# Patient Record
Sex: FEMALE | Race: WHITE | Employment: FULL TIME | URBAN - METROPOLITAN AREA
[De-identification: names, ages, dates, MRNs, and addresses within clinical notes are randomized per-mention and may not be internally consistent; named-entity substitution may affect disease eponyms.]

---

## 2018-10-22 ENCOUNTER — TELEPHONE (OUTPATIENT)
Dept: FAMILY MEDICINE CLINIC | Facility: CLINIC | Age: 23
End: 2018-10-22

## 2018-10-22 NOTE — TELEPHONE ENCOUNTER
Patient calling to make sick appointment, not an established patient. Has first appt to establish care 12/17/18. Patient wanted to be seen for yeast infection symptoms. Advised she may try otc treatment, or may also go to walk-in clinic.  Patient verbalized

## 2018-12-17 ENCOUNTER — OFFICE VISIT (OUTPATIENT)
Dept: FAMILY MEDICINE CLINIC | Facility: CLINIC | Age: 23
End: 2018-12-17

## 2018-12-17 VITALS
BODY MASS INDEX: 19.9 KG/M2 | SYSTOLIC BLOOD PRESSURE: 129 MMHG | DIASTOLIC BLOOD PRESSURE: 55 MMHG | HEIGHT: 70 IN | HEART RATE: 80 BPM | WEIGHT: 139 LBS

## 2018-12-17 DIAGNOSIS — K01.1 IMPACTED TEETH: Primary | ICD-10-CM

## 2018-12-17 PROCEDURE — 99203 OFFICE O/P NEW LOW 30 MIN: CPT | Performed by: FAMILY MEDICINE

## 2018-12-17 PROCEDURE — 99212 OFFICE O/P EST SF 10 MIN: CPT | Performed by: FAMILY MEDICINE

## 2018-12-17 PROCEDURE — 90651 9VHPV VACCINE 2/3 DOSE IM: CPT | Performed by: FAMILY MEDICINE

## 2018-12-17 PROCEDURE — 90471 IMMUNIZATION ADMIN: CPT | Performed by: FAMILY MEDICINE

## 2018-12-17 NOTE — PROGRESS NOTES
Bebo Raman is a 24year old female. Patient presents with:  Consult: wisdom teeth   mm  HPI:   New to me. To establish care. Saw dentist about 3 weeks ago and told impacted wisdom teeth. Does have some pain with it. Never had pap smear done.  Me

## 2019-02-23 ENCOUNTER — NURSE ONLY (OUTPATIENT)
Dept: FAMILY MEDICINE CLINIC | Facility: CLINIC | Age: 24
End: 2019-02-23

## 2019-02-23 DIAGNOSIS — Z23 IMMUNIZATION DUE: Primary | ICD-10-CM

## 2019-02-23 PROCEDURE — 90651 9VHPV VACCINE 2/3 DOSE IM: CPT | Performed by: FAMILY MEDICINE

## 2019-02-23 PROCEDURE — 90471 IMMUNIZATION ADMIN: CPT | Performed by: FAMILY MEDICINE

## 2019-02-23 NOTE — PROGRESS NOTES
Patient is here for her 2nd hpv, verified name,  and medication. Patient tolerated injection well.  Informed patient to return in  for her final hpv vaccine, patient expressed understanding

## 2019-06-04 ENCOUNTER — PATIENT OUTREACH (OUTPATIENT)
Dept: CASE MANAGEMENT | Age: 24
End: 2019-06-04

## 2019-09-21 ENCOUNTER — OFFICE VISIT (OUTPATIENT)
Dept: FAMILY MEDICINE CLINIC | Facility: CLINIC | Age: 24
End: 2019-09-21

## 2019-09-21 ENCOUNTER — LAB ENCOUNTER (OUTPATIENT)
Dept: LAB | Age: 24
End: 2019-09-21
Attending: FAMILY MEDICINE
Payer: COMMERCIAL

## 2019-09-21 VITALS
WEIGHT: 148 LBS | BODY MASS INDEX: 21.19 KG/M2 | SYSTOLIC BLOOD PRESSURE: 111 MMHG | HEART RATE: 54 BPM | DIASTOLIC BLOOD PRESSURE: 69 MMHG | HEIGHT: 70 IN

## 2019-09-21 DIAGNOSIS — R19.00 PELVIC SWELLING: ICD-10-CM

## 2019-09-21 DIAGNOSIS — Z00.00 ROUTINE MEDICAL EXAM: Primary | ICD-10-CM

## 2019-09-21 DIAGNOSIS — Z00.00 ROUTINE MEDICAL EXAM: ICD-10-CM

## 2019-09-21 DIAGNOSIS — K01.1 IMPACTED TOOTH: ICD-10-CM

## 2019-09-21 LAB
ALBUMIN SERPL-MCNC: 3.9 G/DL (ref 3.4–5)
ALBUMIN/GLOB SERPL: 1.1 {RATIO} (ref 1–2)
ALP LIVER SERPL-CCNC: 62 U/L (ref 37–98)
ALT SERPL-CCNC: 29 U/L (ref 13–56)
ANION GAP SERPL CALC-SCNC: 6 MMOL/L (ref 0–18)
AST SERPL-CCNC: 29 U/L (ref 15–37)
BASOPHILS # BLD AUTO: 0.06 X10(3) UL (ref 0–0.2)
BASOPHILS NFR BLD AUTO: 1.3 %
BILIRUB SERPL-MCNC: 0.5 MG/DL (ref 0.1–2)
BUN BLD-MCNC: 14 MG/DL (ref 7–18)
BUN/CREAT SERPL: 13.6 (ref 10–20)
CALCIUM BLD-MCNC: 9.1 MG/DL (ref 8.5–10.1)
CHLORIDE SERPL-SCNC: 107 MMOL/L (ref 98–112)
CHOLEST SMN-MCNC: 160 MG/DL (ref ?–200)
CO2 SERPL-SCNC: 27 MMOL/L (ref 21–32)
CREAT BLD-MCNC: 1.03 MG/DL (ref 0.55–1.02)
DEPRECATED RDW RBC AUTO: 47.6 FL (ref 35.1–46.3)
EOSINOPHIL # BLD AUTO: 0.16 X10(3) UL (ref 0–0.7)
EOSINOPHIL NFR BLD AUTO: 3.3 %
ERYTHROCYTE [DISTWIDTH] IN BLOOD BY AUTOMATED COUNT: 15.5 % (ref 11–15)
GLOBULIN PLAS-MCNC: 3.4 G/DL (ref 2.8–4.4)
GLUCOSE BLD-MCNC: 82 MG/DL (ref 70–99)
HCT VFR BLD AUTO: 35.7 % (ref 35–48)
HDLC SERPL-MCNC: 64 MG/DL (ref 40–59)
HGB BLD-MCNC: 11.2 G/DL (ref 12–16)
IMM GRANULOCYTES # BLD AUTO: 0.01 X10(3) UL (ref 0–1)
IMM GRANULOCYTES NFR BLD: 0.2 %
LDLC SERPL CALC-MCNC: 88 MG/DL (ref ?–100)
LYMPHOCYTES # BLD AUTO: 1.61 X10(3) UL (ref 1–4)
LYMPHOCYTES NFR BLD AUTO: 33.7 %
M PROTEIN MFR SERPL ELPH: 7.3 G/DL (ref 6.4–8.2)
MCH RBC QN AUTO: 26.7 PG (ref 26–34)
MCHC RBC AUTO-ENTMCNC: 31.4 G/DL (ref 31–37)
MCV RBC AUTO: 85 FL (ref 80–100)
MONOCYTES # BLD AUTO: 0.49 X10(3) UL (ref 0.1–1)
MONOCYTES NFR BLD AUTO: 10.3 %
NEUTROPHILS # BLD AUTO: 2.45 X10 (3) UL (ref 1.5–7.7)
NEUTROPHILS # BLD AUTO: 2.45 X10(3) UL (ref 1.5–7.7)
NEUTROPHILS NFR BLD AUTO: 51.2 %
NONHDLC SERPL-MCNC: 96 MG/DL (ref ?–130)
OSMOLALITY SERPL CALC.SUM OF ELEC: 290 MOSM/KG (ref 275–295)
PATIENT FASTING: YES
PATIENT FASTING: YES
PLATELET # BLD AUTO: 285 10(3)UL (ref 150–450)
POTASSIUM SERPL-SCNC: 4.5 MMOL/L (ref 3.5–5.1)
RBC # BLD AUTO: 4.2 X10(6)UL (ref 3.8–5.3)
SODIUM SERPL-SCNC: 140 MMOL/L (ref 136–145)
TRIGL SERPL-MCNC: 42 MG/DL (ref 30–149)
TSI SER-ACNC: 1.38 MIU/ML (ref 0.36–3.74)
VLDLC SERPL CALC-MCNC: 8 MG/DL (ref 0–30)
WBC # BLD AUTO: 4.8 X10(3) UL (ref 4–11)

## 2019-09-21 PROCEDURE — 80053 COMPREHEN METABOLIC PANEL: CPT

## 2019-09-21 PROCEDURE — 80061 LIPID PANEL: CPT

## 2019-09-21 PROCEDURE — 90651 9VHPV VACCINE 2/3 DOSE IM: CPT | Performed by: FAMILY MEDICINE

## 2019-09-21 PROCEDURE — 36415 COLL VENOUS BLD VENIPUNCTURE: CPT

## 2019-09-21 PROCEDURE — 82306 VITAMIN D 25 HYDROXY: CPT

## 2019-09-21 PROCEDURE — 99395 PREV VISIT EST AGE 18-39: CPT | Performed by: FAMILY MEDICINE

## 2019-09-21 PROCEDURE — 90471 IMMUNIZATION ADMIN: CPT | Performed by: FAMILY MEDICINE

## 2019-09-21 PROCEDURE — 84443 ASSAY THYROID STIM HORMONE: CPT

## 2019-09-21 PROCEDURE — 85025 COMPLETE CBC W/AUTO DIFF WBC: CPT

## 2019-09-21 NOTE — PROGRESS NOTES
HPI:   Chaya Goldstein is a 21year old female who presents for a complete physical exam.    Pt never had wisdom teeth removed. Monthly menses more or less. Sometimes does skip a month. Exercising. Does eat healthy overall.    Wt Readings from Last 3 tenderness  LUNGS: clear to auscultation  CARDIO: RRR without murmur  GI: good BS's,no HSM or tenderness, feels like 20 week US - firm to umbilicus. No tenderness.    MUSCULOSKELETAL: back is not tender,FROM of the back  EXTREMITIES: no cyanosis, clubbing o

## 2019-09-23 LAB — 25(OH)D3 SERPL-MCNC: 29.7 NG/ML (ref 30–100)

## 2019-09-24 NOTE — PROGRESS NOTES
Olena Lawrence - labs were all normal except mildly decreased vitamin D levels.  Please take over the counter vitamin D 2000 units daily. - Dr. Pineda Shells

## 2019-10-26 ENCOUNTER — HOSPITAL ENCOUNTER (OUTPATIENT)
Dept: ULTRASOUND IMAGING | Facility: HOSPITAL | Age: 24
Discharge: HOME | End: 2019-10-26
Attending: FAMILY MEDICINE
Payer: COMMERCIAL

## 2019-10-26 DIAGNOSIS — R19.00 PELVIC SWELLING: ICD-10-CM

## 2019-10-26 PROCEDURE — 76830 TRANSVAGINAL US NON-OB: CPT | Performed by: FAMILY MEDICINE

## 2019-10-26 PROCEDURE — 76856 US EXAM PELVIC COMPLETE: CPT | Performed by: FAMILY MEDICINE

## 2019-10-27 NOTE — PROGRESS NOTES
Pt has very large fibroid in her uterus causing the abdominal enlargement. It is 15 cm big. She needs to see gyne this week. Please give her options of OB groups and help get her an appointment.

## 2019-10-31 ENCOUNTER — OFFICE VISIT (OUTPATIENT)
Dept: OBGYN CLINIC | Facility: CLINIC | Age: 24
End: 2019-10-31

## 2019-10-31 VITALS
WEIGHT: 154 LBS | SYSTOLIC BLOOD PRESSURE: 123 MMHG | BODY MASS INDEX: 22 KG/M2 | DIASTOLIC BLOOD PRESSURE: 66 MMHG | HEART RATE: 74 BPM

## 2019-10-31 DIAGNOSIS — Z12.4 SCREENING FOR MALIGNANT NEOPLASM OF CERVIX: ICD-10-CM

## 2019-10-31 DIAGNOSIS — R19.00 PELVIC MASS IN FEMALE: Primary | ICD-10-CM

## 2019-10-31 PROCEDURE — 99214 OFFICE O/P EST MOD 30 MIN: CPT | Performed by: OBSTETRICS & GYNECOLOGY

## 2019-11-11 NOTE — H&P
HPI:  The patient is a 20 yo F who presents for consultation and recommendations regarding an abnormal ultrasound. The patient had an ultrasound performed on October 31 which showed a 15.6 cm soft tissue mass suspected to be a large pedunculated fibroid. mass.    OTHER:                          Negative.   Bladder appears normal.       Summary:                 Last Menstrual Period:           2019-09-23       Pelvis and Uterus:                Uterus Length:       11.81 cm           Uterus Height:       3.80 Non-medical: Not on file    Tobacco Use      Smoking status: Never Smoker      Smokeless tobacco: Never Used    Substance and Sexual Activity      Alcohol use: Yes        Comment: weekends       Drug use: No      Sexual activity: Not on file    Lifestyle normocephalic  Abdomen:  soft, nontender, nondistended, pelvic mass to the level of the umbilicus  Psychiatric: Appropriate mood and affect    Pelvic Exam:  External Genitalia: normal appearance, hair distribution, and no lesions  Urethral Meatus:  normal

## 2019-11-14 ENCOUNTER — HOSPITAL ENCOUNTER (OUTPATIENT)
Dept: MRI IMAGING | Facility: HOSPITAL | Age: 24
Discharge: HOME | End: 2019-11-14
Attending: OBSTETRICS & GYNECOLOGY
Payer: COMMERCIAL

## 2019-11-14 DIAGNOSIS — R19.00 PELVIC MASS IN FEMALE: ICD-10-CM

## 2019-11-14 PROCEDURE — A9575 INJ GADOTERATE MEGLUMI 0.1ML: HCPCS | Performed by: OBSTETRICS & GYNECOLOGY

## 2019-11-14 PROCEDURE — 74183 MRI ABD W/O CNTR FLWD CNTR: CPT | Performed by: OBSTETRICS & GYNECOLOGY

## 2019-11-14 PROCEDURE — 72197 MRI PELVIS W/O & W/DYE: CPT | Performed by: OBSTETRICS & GYNECOLOGY

## 2019-11-20 ENCOUNTER — TELEPHONE (OUTPATIENT)
Dept: OBGYN CLINIC | Facility: CLINIC | Age: 24
End: 2019-11-20

## 2019-11-20 NOTE — TELEPHONE ENCOUNTER
----- Message from Brendan Tan DO sent at 11/20/2019 11:11 AM CST -----  Please notify patient MRI shows 17 cm fibroid. I need her to come in to the office talk about surgery due to its size and her symptoms.

## 2019-11-20 NOTE — TELEPHONE ENCOUNTER
Informed GORDO that  MRI shows 17 cm fibroid. I need her to come in to the office talk about surgery due to its size and her symptoms.   Pt made an appt.

## 2019-11-26 ENCOUNTER — OFFICE VISIT (OUTPATIENT)
Dept: OBGYN CLINIC | Facility: CLINIC | Age: 24
End: 2019-11-26

## 2019-11-26 VITALS
SYSTOLIC BLOOD PRESSURE: 121 MMHG | WEIGHT: 146 LBS | BODY MASS INDEX: 21 KG/M2 | DIASTOLIC BLOOD PRESSURE: 69 MMHG | HEART RATE: 69 BPM

## 2019-11-26 DIAGNOSIS — D25.9 UTERINE LEIOMYOMA, UNSPECIFIED LOCATION: Primary | ICD-10-CM

## 2019-11-26 PROCEDURE — 99214 OFFICE O/P EST MOD 30 MIN: CPT | Performed by: OBSTETRICS & GYNECOLOGY

## 2019-11-27 ENCOUNTER — TELEPHONE (OUTPATIENT)
Dept: OBGYN CLINIC | Facility: CLINIC | Age: 24
End: 2019-11-27

## 2019-11-27 DIAGNOSIS — D25.9 UTERINE LEIOMYOMA, UNSPECIFIED LOCATION: Primary | ICD-10-CM

## 2019-11-27 DIAGNOSIS — R10.2 PELVIC PAIN: ICD-10-CM

## 2019-11-27 NOTE — TELEPHONE ENCOUNTER
OB GYN SURGICAL SCHEDULING    Diagnosis: Fibroid Uterus, Pelvic pain, heavy menstrual bleeding    Operative Procedure:  Abdominal Myomectomy    Side: n/a    Date: 12/16/19 in afternoon after office    Admission:  AM Admit    Anesthesia:  General     Bowel Prep:  No    Cytotec (200 mcg night prior):  No    Rep needed: no    IPA / [employer] tubals or hyst:  Consent signed   not indicated    Additional Orders:  Routine Orders and MD assist--KAIA to assist while on call    Comments / Orders to Nurse: Needs CBC and Type and Screen before surgery    Discussed possible complications including but not limited to:  See office note

## 2019-12-04 NOTE — TELEPHONE ENCOUNTER
How about the 19th in the AM (I have office at 1pm) or Dec 23rd after clinic or Jan 2nd in AM before clinic (start at 1pm). Is KAIA available those days?

## 2019-12-04 NOTE — TELEPHONE ENCOUNTER
I called and spoke with OR manager Jacob Mcmullen. He said we can add case on at 1230pm on Monday Dec 16th.   Surgery order needs to be sent to OR

## 2019-12-04 NOTE — TELEPHONE ENCOUNTER
Patient is scheduled 12/16/19 1pm abdominal myomectomy GORDO/KAIA. Lucrecia Nassar orders were routed. Instructions routed via my chart and pt was informed.

## 2019-12-04 NOTE — H&P
HPI:  The patient is a 26-year-old female who presents for conversation regarding large uterine fibroid. The patient had an MRI performed on 10/31/2019 after ultrasound in September showed 15 cm pedunculated soft tissue mass suspected to be a fibroid.   MR areas of necrosis/cystic degeneration. Numerous prominent serpiginous periuterine collateral vessels are seen throughout the pelvis. Numerous small bilateral follicles are present throughout the ovaries.  There is trace free pe on file      Highest education level: Not on file    Occupational History      Not on file    Social Needs      Financial resource strain: Not on file      Food insecurity:        Worry: Not on file        Inability: Not on file      Transportation needs: any breast pain, lumps, or discharge. Neurological:  denies headaches, extremity weakness  Psychiatric: denies depression or anxiety.        11/26/19  0953   BP: 121/69   Pulse: 69       PHYSICAL EXAM:   Constitutional: well developed, well nourished  Hea size.  Patient does report that this has been slow growing and she has been noticing bloating for multiple years. I did discuss with her that if this is a sarcoma she would need additional surgery and her beta D would be increased as a result.   She voices

## 2019-12-12 ENCOUNTER — LAB ENCOUNTER (OUTPATIENT)
Dept: LAB | Age: 24
End: 2019-12-12
Attending: OBSTETRICS & GYNECOLOGY
Payer: COMMERCIAL

## 2019-12-12 DIAGNOSIS — Z01.818 PRE-OP TESTING: ICD-10-CM

## 2019-12-12 DIAGNOSIS — Z00.00 ROUTINE MEDICAL EXAM: ICD-10-CM

## 2019-12-12 PROCEDURE — 86900 BLOOD TYPING SEROLOGIC ABO: CPT

## 2019-12-12 PROCEDURE — 36415 COLL VENOUS BLD VENIPUNCTURE: CPT

## 2019-12-12 PROCEDURE — 86901 BLOOD TYPING SEROLOGIC RH(D): CPT

## 2019-12-12 PROCEDURE — 85025 COMPLETE CBC W/AUTO DIFF WBC: CPT

## 2019-12-12 PROCEDURE — 86850 RBC ANTIBODY SCREEN: CPT

## 2019-12-12 PROCEDURE — 82607 VITAMIN B-12: CPT

## 2019-12-16 ENCOUNTER — HOSPITAL ENCOUNTER (INPATIENT)
Facility: HOSPITAL | Age: 24
LOS: 2 days | Discharge: HOME | DRG: 743 | End: 2019-12-18
Attending: OBSTETRICS & GYNECOLOGY | Admitting: OBSTETRICS & GYNECOLOGY
Payer: COMMERCIAL

## 2019-12-16 ENCOUNTER — ANESTHESIA (OUTPATIENT)
Dept: SURGERY | Facility: HOSPITAL | Age: 24
DRG: 743 | End: 2019-12-16
Payer: COMMERCIAL

## 2019-12-16 ENCOUNTER — ANESTHESIA EVENT (OUTPATIENT)
Dept: SURGERY | Facility: HOSPITAL | Age: 24
DRG: 743 | End: 2019-12-16
Payer: COMMERCIAL

## 2019-12-16 DIAGNOSIS — Z01.818 PRE-OP TESTING: Primary | ICD-10-CM

## 2019-12-16 DIAGNOSIS — R10.2 PELVIC PAIN: ICD-10-CM

## 2019-12-16 DIAGNOSIS — D25.9 UTERINE LEIOMYOMA, UNSPECIFIED LOCATION: ICD-10-CM

## 2019-12-16 PROCEDURE — 0UB90ZZ EXCISION OF UTERUS, OPEN APPROACH: ICD-10-PCS | Performed by: OBSTETRICS & GYNECOLOGY

## 2019-12-16 PROCEDURE — 58146 MYOMECTOMY ABDOM COMPLEX: CPT | Performed by: OBSTETRICS & GYNECOLOGY

## 2019-12-16 DEVICE — INTERCEED: Type: IMPLANTABLE DEVICE | Site: ABDOMEN | Status: FUNCTIONAL

## 2019-12-16 RX ORDER — ROCURONIUM BROMIDE 10 MG/ML
INJECTION, SOLUTION INTRAVENOUS AS NEEDED
Status: DISCONTINUED | OUTPATIENT
Start: 2019-12-16 | End: 2019-12-16 | Stop reason: SURG

## 2019-12-16 RX ORDER — DOCUSATE SODIUM 100 MG/1
100 CAPSULE, LIQUID FILLED ORAL 2 TIMES DAILY
Status: DISCONTINUED | OUTPATIENT
Start: 2019-12-16 | End: 2019-12-18

## 2019-12-16 RX ORDER — SCOLOPAMINE TRANSDERMAL SYSTEM 1 MG/1
1 PATCH, EXTENDED RELEASE TRANSDERMAL
Status: DISCONTINUED | OUTPATIENT
Start: 2019-12-16 | End: 2019-12-18

## 2019-12-16 RX ORDER — SODIUM CHLORIDE 0.9 % (FLUSH) 0.9 %
10 SYRINGE (ML) INJECTION AS NEEDED
Status: DISCONTINUED | OUTPATIENT
Start: 2019-12-16 | End: 2019-12-18

## 2019-12-16 RX ORDER — NALOXONE HYDROCHLORIDE 0.4 MG/ML
80 INJECTION, SOLUTION INTRAMUSCULAR; INTRAVENOUS; SUBCUTANEOUS AS NEEDED
Status: DISCONTINUED | OUTPATIENT
Start: 2019-12-16 | End: 2019-12-16 | Stop reason: HOSPADM

## 2019-12-16 RX ORDER — HALOPERIDOL 5 MG/ML
0.25 INJECTION INTRAMUSCULAR ONCE AS NEEDED
Status: DISCONTINUED | OUTPATIENT
Start: 2019-12-16 | End: 2019-12-16 | Stop reason: HOSPADM

## 2019-12-16 RX ORDER — MORPHINE SULFATE 4 MG/ML
4 INJECTION, SOLUTION INTRAMUSCULAR; INTRAVENOUS EVERY 10 MIN PRN
Status: DISCONTINUED | OUTPATIENT
Start: 2019-12-16 | End: 2019-12-16 | Stop reason: HOSPADM

## 2019-12-16 RX ORDER — HYDROCODONE BITARTRATE AND ACETAMINOPHEN 5; 325 MG/1; MG/1
1 TABLET ORAL AS NEEDED
Status: DISCONTINUED | OUTPATIENT
Start: 2019-12-16 | End: 2019-12-16 | Stop reason: HOSPADM

## 2019-12-16 RX ORDER — HYDROMORPHONE HYDROCHLORIDE 1 MG/ML
0.6 INJECTION, SOLUTION INTRAMUSCULAR; INTRAVENOUS; SUBCUTANEOUS EVERY 5 MIN PRN
Status: DISCONTINUED | OUTPATIENT
Start: 2019-12-16 | End: 2019-12-16 | Stop reason: HOSPADM

## 2019-12-16 RX ORDER — METOCLOPRAMIDE 10 MG/1
10 TABLET ORAL ONCE
Status: DISCONTINUED | OUTPATIENT
Start: 2019-12-16 | End: 2019-12-16 | Stop reason: HOSPADM

## 2019-12-16 RX ORDER — ONDANSETRON 2 MG/ML
4 INJECTION INTRAMUSCULAR; INTRAVENOUS EVERY 8 HOURS PRN
Status: DISCONTINUED | OUTPATIENT
Start: 2019-12-16 | End: 2019-12-17

## 2019-12-16 RX ORDER — KETOROLAC TROMETHAMINE 30 MG/ML
30 INJECTION, SOLUTION INTRAMUSCULAR; INTRAVENOUS EVERY 6 HOURS PRN
Status: DISCONTINUED | OUTPATIENT
Start: 2019-12-16 | End: 2019-12-17

## 2019-12-16 RX ORDER — FAMOTIDINE 20 MG/1
20 TABLET ORAL ONCE
Status: DISCONTINUED | OUTPATIENT
Start: 2019-12-16 | End: 2019-12-16 | Stop reason: HOSPADM

## 2019-12-16 RX ORDER — HYDROMORPHONE HYDROCHLORIDE 1 MG/ML
0.4 INJECTION, SOLUTION INTRAMUSCULAR; INTRAVENOUS; SUBCUTANEOUS EVERY 5 MIN PRN
Status: DISCONTINUED | OUTPATIENT
Start: 2019-12-16 | End: 2019-12-16 | Stop reason: HOSPADM

## 2019-12-16 RX ORDER — MORPHINE SULFATE 10 MG/ML
6 INJECTION, SOLUTION INTRAMUSCULAR; INTRAVENOUS EVERY 10 MIN PRN
Status: DISCONTINUED | OUTPATIENT
Start: 2019-12-16 | End: 2019-12-16 | Stop reason: HOSPADM

## 2019-12-16 RX ORDER — POLYETHYLENE GLYCOL 3350 17 G/17G
17 POWDER, FOR SOLUTION ORAL DAILY PRN
Status: DISCONTINUED | OUTPATIENT
Start: 2019-12-16 | End: 2019-12-18

## 2019-12-16 RX ORDER — SODIUM CHLORIDE, SODIUM LACTATE, POTASSIUM CHLORIDE, CALCIUM CHLORIDE 600; 310; 30; 20 MG/100ML; MG/100ML; MG/100ML; MG/100ML
INJECTION, SOLUTION INTRAVENOUS CONTINUOUS
Status: DISCONTINUED | OUTPATIENT
Start: 2019-12-16 | End: 2019-12-17

## 2019-12-16 RX ORDER — LIDOCAINE HYDROCHLORIDE 10 MG/ML
INJECTION, SOLUTION EPIDURAL; INFILTRATION; INTRACAUDAL; PERINEURAL AS NEEDED
Status: DISCONTINUED | OUTPATIENT
Start: 2019-12-16 | End: 2019-12-16 | Stop reason: SURG

## 2019-12-16 RX ORDER — SODIUM PHOSPHATE, DIBASIC AND SODIUM PHOSPHATE, MONOBASIC 7; 19 G/133ML; G/133ML
1 ENEMA RECTAL ONCE AS NEEDED
Status: DISCONTINUED | OUTPATIENT
Start: 2019-12-16 | End: 2019-12-18

## 2019-12-16 RX ORDER — BISACODYL 10 MG
10 SUPPOSITORY, RECTAL RECTAL
Status: DISCONTINUED | OUTPATIENT
Start: 2019-12-16 | End: 2019-12-18

## 2019-12-16 RX ORDER — CEFAZOLIN SODIUM/WATER 2 G/20 ML
2 SYRINGE (ML) INTRAVENOUS ONCE
Status: COMPLETED | OUTPATIENT
Start: 2019-12-16 | End: 2019-12-16

## 2019-12-16 RX ORDER — MORPHINE SULFATE 4 MG/ML
2 INJECTION, SOLUTION INTRAMUSCULAR; INTRAVENOUS EVERY 10 MIN PRN
Status: DISCONTINUED | OUTPATIENT
Start: 2019-12-16 | End: 2019-12-16 | Stop reason: HOSPADM

## 2019-12-16 RX ORDER — PROCHLORPERAZINE EDISYLATE 5 MG/ML
5 INJECTION INTRAMUSCULAR; INTRAVENOUS ONCE AS NEEDED
Status: DISCONTINUED | OUTPATIENT
Start: 2019-12-16 | End: 2019-12-16 | Stop reason: HOSPADM

## 2019-12-16 RX ORDER — DIPHENHYDRAMINE HYDROCHLORIDE 50 MG/ML
12.5 INJECTION INTRAMUSCULAR; INTRAVENOUS EVERY 4 HOURS PRN
Status: DISCONTINUED | OUTPATIENT
Start: 2019-12-16 | End: 2019-12-18

## 2019-12-16 RX ORDER — ONDANSETRON 2 MG/ML
4 INJECTION INTRAMUSCULAR; INTRAVENOUS ONCE AS NEEDED
Status: DISCONTINUED | OUTPATIENT
Start: 2019-12-16 | End: 2019-12-16 | Stop reason: HOSPADM

## 2019-12-16 RX ORDER — KETOROLAC TROMETHAMINE 30 MG/ML
INJECTION, SOLUTION INTRAMUSCULAR; INTRAVENOUS AS NEEDED
Status: DISCONTINUED | OUTPATIENT
Start: 2019-12-16 | End: 2019-12-16 | Stop reason: SURG

## 2019-12-16 RX ORDER — MIDAZOLAM HYDROCHLORIDE 1 MG/ML
INJECTION INTRAMUSCULAR; INTRAVENOUS AS NEEDED
Status: DISCONTINUED | OUTPATIENT
Start: 2019-12-16 | End: 2019-12-16 | Stop reason: SURG

## 2019-12-16 RX ORDER — ONDANSETRON 4 MG/1
4 TABLET, FILM COATED ORAL EVERY 8 HOURS PRN
Status: DISCONTINUED | OUTPATIENT
Start: 2019-12-16 | End: 2019-12-17

## 2019-12-16 RX ORDER — HYDROCODONE BITARTRATE AND ACETAMINOPHEN 5; 325 MG/1; MG/1
2 TABLET ORAL AS NEEDED
Status: DISCONTINUED | OUTPATIENT
Start: 2019-12-16 | End: 2019-12-16 | Stop reason: HOSPADM

## 2019-12-16 RX ORDER — ONDANSETRON 2 MG/ML
INJECTION INTRAMUSCULAR; INTRAVENOUS AS NEEDED
Status: DISCONTINUED | OUTPATIENT
Start: 2019-12-16 | End: 2019-12-16 | Stop reason: SURG

## 2019-12-16 RX ORDER — HYDROMORPHONE HYDROCHLORIDE 1 MG/ML
0.2 INJECTION, SOLUTION INTRAMUSCULAR; INTRAVENOUS; SUBCUTANEOUS EVERY 5 MIN PRN
Status: DISCONTINUED | OUTPATIENT
Start: 2019-12-16 | End: 2019-12-16 | Stop reason: HOSPADM

## 2019-12-16 RX ORDER — ACETAMINOPHEN 500 MG
1000 TABLET ORAL ONCE
Status: COMPLETED | OUTPATIENT
Start: 2019-12-16 | End: 2019-12-16

## 2019-12-16 RX ORDER — DEXAMETHASONE SODIUM PHOSPHATE 4 MG/ML
VIAL (ML) INJECTION AS NEEDED
Status: DISCONTINUED | OUTPATIENT
Start: 2019-12-16 | End: 2019-12-16 | Stop reason: SURG

## 2019-12-16 RX ORDER — HYDROMORPHONE HYDROCHLORIDE 1 MG/ML
INJECTION, SOLUTION INTRAMUSCULAR; INTRAVENOUS; SUBCUTANEOUS AS NEEDED
Status: DISCONTINUED | OUTPATIENT
Start: 2019-12-16 | End: 2019-12-16 | Stop reason: SURG

## 2019-12-16 RX ORDER — SODIUM CHLORIDE, SODIUM LACTATE, POTASSIUM CHLORIDE, CALCIUM CHLORIDE 600; 310; 30; 20 MG/100ML; MG/100ML; MG/100ML; MG/100ML
INJECTION, SOLUTION INTRAVENOUS CONTINUOUS
Status: DISCONTINUED | OUTPATIENT
Start: 2019-12-16 | End: 2019-12-16 | Stop reason: HOSPADM

## 2019-12-16 RX ADMIN — LIDOCAINE HYDROCHLORIDE 50 MG: 10 INJECTION, SOLUTION EPIDURAL; INFILTRATION; INTRACAUDAL; PERINEURAL at 13:03:00

## 2019-12-16 RX ADMIN — DEXAMETHASONE SODIUM PHOSPHATE 4 MG: 4 MG/ML VIAL (ML) INJECTION at 13:03:00

## 2019-12-16 RX ADMIN — ROCURONIUM BROMIDE 50 MG: 10 INJECTION, SOLUTION INTRAVENOUS at 13:09:00

## 2019-12-16 RX ADMIN — SODIUM CHLORIDE, SODIUM LACTATE, POTASSIUM CHLORIDE, CALCIUM CHLORIDE: 600; 310; 30; 20 INJECTION, SOLUTION INTRAVENOUS at 14:19:00

## 2019-12-16 RX ADMIN — HYDROMORPHONE HYDROCHLORIDE 0.5 MG: 1 INJECTION, SOLUTION INTRAMUSCULAR; INTRAVENOUS; SUBCUTANEOUS at 13:17:00

## 2019-12-16 RX ADMIN — KETOROLAC TROMETHAMINE 30 MG: 30 INJECTION, SOLUTION INTRAMUSCULAR; INTRAVENOUS at 14:29:00

## 2019-12-16 RX ADMIN — SODIUM CHLORIDE, SODIUM LACTATE, POTASSIUM CHLORIDE, CALCIUM CHLORIDE: 600; 310; 30; 20 INJECTION, SOLUTION INTRAVENOUS at 13:03:00

## 2019-12-16 RX ADMIN — ONDANSETRON 4 MG: 2 INJECTION INTRAMUSCULAR; INTRAVENOUS at 13:03:00

## 2019-12-16 RX ADMIN — MIDAZOLAM HYDROCHLORIDE 2 MG: 1 INJECTION INTRAMUSCULAR; INTRAVENOUS at 13:03:00

## 2019-12-16 RX ADMIN — CEFAZOLIN SODIUM/WATER 2 G: 2 G/20 ML SYRINGE (ML) INTRAVENOUS at 13:16:00

## 2019-12-16 NOTE — PROGRESS NOTES
Tiffany - Normal B12 levels. Hope all goes well with your surgery.  I am sure you will feel much better after that fibroid is removed. - Dr. Turk Anglican

## 2019-12-16 NOTE — PLAN OF CARE
Received gyne patient into room 349. Report given from PACU RN. Vitals signs stable, IV site unremarkable. Pain 0/10. bed in locked and low position. Patient transferred in to bed by Patient oriented to room and unit. Call light within reach.   Plan

## 2019-12-16 NOTE — H&P
[address] Patient Status:  Surgery Admit - Inpt    1995 MRN [id number]   Location [place] [address] Attending Deric Grossman, [address] Day # 0 PCP Cinthya Mauricio MD umbilicus  Pelvis: defer to OR          Study Result     PROCEDURE:  US PELVIS TRANSABDOMINAL AND TRANSVAGINAL (CPT=76856/77669)     COMPARISON: None. INDICATIONS:  Bloating.      TECHNIQUE:    Pelvic ultrasound using transabdominal and transvaginal dragan Dictated by (CST): Bibiana Guardado MD on 10/26/2019 at 11:06       Approved by (CST): Bibiana Guardado MD on 10/26/2019 at 11:12          PROCEDURE:  MRI ABDOMEN+PELVIS (ALL W+WO) (FCL=68222/74027)     COMPARISON: [facility] volume. ABDOMINAL WALL:      There is a minute fat-containing umbilical hernia. BONES:             Suboptimally assessed by scan protocol, but without grossly apparent bony lesion or fracture. Slight right convex curvature of the lumbar spine is noted.  Garrison Essex % 54.3 %    Lymphocyte % 32.9 %    Monocyte % 9.4 %    Eosinophil % 2.5 %    Basophil % 0.7 %    Immature Granulocyte % 0.2 %        ASSESSMENT:    20 yo F with 17 cm uterine fibroid here for abdominal myomectoy      PLAN:      Risks of surgery discussed i

## 2019-12-16 NOTE — OPERATIVE REPORT
Operative Note    Patient Name: Maribell Houston    Preoperative Diagnosis: Uterine leiomyoma, unspecified location [D25.9]  Pelvic pain [R10.2]    Postoperative Diagnosis: Uterine leiomyoma, unspecified location [D25. 9]Pelvic pain [R10.2]    Surgeon(s) the Bovie as close to the connection of the stalk to the fibroid in order to avoid involvement in the myometrium. During dissection and transection with the Bovie we did enter the endometrial cavity with a 5 mm to 1 cm defect.   Once the fibroid was comple

## 2019-12-16 NOTE — ANESTHESIA PROCEDURE NOTES
Airway  Date/Time: 12/16/2019 1:16 PM  Urgency: Elective    Airway not difficult    General Information and Staff    Patient location during procedure: OR  Anesthesiologist: Sharrie Councilman, MD  Performed: anesthesiologist     Indications and Patient [facility]

## 2019-12-16 NOTE — ANESTHESIA POSTPROCEDURE EVALUATION
Patient: Valentine Rosales    Procedure Summary     Date:  12/16/19 Room / Location:  [address] [place] [address] MAIN OR  / [address] [place] [address] MAIN OR    Anesthesia Start:  9670 Anesthesia Stop:  7707    Procedure:  LAPAROTOMY MYOMECTOMY (N/A Abdomen) Diagnosis:       Uterine leiomyoma, un

## 2019-12-16 NOTE — ANESTHESIA PREPROCEDURE EVALUATION
Anesthesia PreOp Note    HPI:     Shayne Holliday is a 21year old female who presents for preoperative consultation requested by: Bubba Shultz DO    Date of Surgery: 12/16/2019    Procedure(s):  LAPAROTOMY MYOMECTOMY  Indication: Uterine leiomy on file    Occupational History      Not on file    Social Needs      Financial resource strain: Not on file      Food insecurity:        Worry: Not on file        Inability: Not on file      Transportation needs:        Medical: Not on file        Non-med Her blood pressure is 109/61 and her pulse is 66. Her respiration is 18 and oxygen saturation is 99%.     12/10/19  0947 12/16/19  1159   BP:  109/61   Pulse:  66   Resp:  18   Temp:  99.3 °F (37.4 °C)   TempSrc:  Oral   SpO2:  99%   Weight: 67.1 kg (148 lb

## 2019-12-17 ENCOUNTER — TELEPHONE (OUTPATIENT)
Dept: OBGYN CLINIC | Facility: CLINIC | Age: 24
End: 2019-12-17

## 2019-12-17 RX ORDER — HYDROCODONE BITARTRATE AND ACETAMINOPHEN 5; 325 MG/1; MG/1
1 TABLET ORAL EVERY 6 HOURS PRN
Status: DISCONTINUED | OUTPATIENT
Start: 2019-12-17 | End: 2019-12-18

## 2019-12-17 RX ORDER — SIMETHICONE 80 MG
80 TABLET,CHEWABLE ORAL
Status: DISCONTINUED | OUTPATIENT
Start: 2019-12-17 | End: 2019-12-18

## 2019-12-17 RX ORDER — DIPHENHYDRAMINE HCL 25 MG
25 CAPSULE ORAL EVERY 6 HOURS PRN
Status: DISCONTINUED | OUTPATIENT
Start: 2019-12-17 | End: 2019-12-18

## 2019-12-17 RX ORDER — CALCIUM CARBONATE 200(500)MG
500 TABLET,CHEWABLE ORAL
Status: DISCONTINUED | OUTPATIENT
Start: 2019-12-17 | End: 2019-12-18

## 2019-12-17 RX ORDER — PSEUDOEPHEDRINE HCL 30 MG
100 TABLET ORAL 2 TIMES DAILY
Qty: 30 CAPSULE | Refills: 1 | Status: SHIPPED | OUTPATIENT
Start: 2019-12-17 | End: 2019-12-18

## 2019-12-17 RX ORDER — ONDANSETRON 4 MG/1
4 TABLET, FILM COATED ORAL ONCE
Status: DISCONTINUED | OUTPATIENT
Start: 2019-12-17 | End: 2019-12-17

## 2019-12-17 RX ORDER — IBUPROFEN 600 MG/1
600 TABLET ORAL EVERY 6 HOURS PRN
Qty: 30 TABLET | Refills: 0 | Status: SHIPPED | OUTPATIENT
Start: 2019-12-17 | End: 2019-12-18

## 2019-12-17 RX ORDER — ONDANSETRON 4 MG/1
4 TABLET, FILM COATED ORAL EVERY 6 HOURS PRN
Status: DISCONTINUED | OUTPATIENT
Start: 2019-12-17 | End: 2019-12-18

## 2019-12-17 RX ORDER — HYDROCODONE BITARTRATE AND ACETAMINOPHEN 5; 325 MG/1; MG/1
1 TABLET ORAL EVERY 6 HOURS PRN
Qty: 20 TABLET | Refills: 0 | Status: SHIPPED | OUTPATIENT
Start: 2019-12-17 | End: 2019-12-18

## 2019-12-17 RX ORDER — IBUPROFEN 600 MG/1
600 TABLET ORAL EVERY 6 HOURS PRN
Status: DISCONTINUED | OUTPATIENT
Start: 2019-12-17 | End: 2019-12-18

## 2019-12-17 NOTE — PROGRESS NOTES
Received call from Dr. Loulou Blevins regarding pt status. Reported on output, nausea resolved, pain well-controlled. Received verbal to discontinue anand at 6 am, ok to advance diet. Keep PCA until he arrives.

## 2019-12-17 NOTE — DISCHARGE SUMMARY
[facility]    Discharge Summary    Baldo Headley Patient Status:  Inpatient    1995 MRN [id number]   Location [facility] 3SE Attending Reynaldo Mai DO   Hosp Day # 2 PCP Adolfo Myers MD     Admit Date: /

## 2019-12-17 NOTE — PROGRESS NOTES
GYN Post-Op Note  Date:  12/17/2019, 1:11 PM    Subjective:  Pt seen and examined. No issues overnight. Tolerating general diet. +flatus. No bM. Multiple voids w/o issues. No VB. No issues with walking to bathroom. Denies f/c/cp/sob/n/v.   Thinks sh Norco, motrin, and colace. Plan of care reviewed with patient, family and RN staff.         Juhi Cook,   12/17/19 @  1:11 PM

## 2019-12-18 VITALS
HEIGHT: 70 IN | BODY MASS INDEX: 20.21 KG/M2 | HEART RATE: 68 BPM | TEMPERATURE: 99 F | SYSTOLIC BLOOD PRESSURE: 96 MMHG | OXYGEN SATURATION: 98 % | DIASTOLIC BLOOD PRESSURE: 47 MMHG | WEIGHT: 141.13 LBS | RESPIRATION RATE: 18 BRPM

## 2019-12-18 RX ORDER — PSEUDOEPHEDRINE HCL 30 MG
100 TABLET ORAL 2 TIMES DAILY
Qty: 30 CAPSULE | Refills: 1 | Status: SHIPPED | OUTPATIENT
Start: 2019-12-18 | End: 2021-07-09

## 2019-12-18 RX ORDER — HYDROCODONE BITARTRATE AND ACETAMINOPHEN 5; 325 MG/1; MG/1
1 TABLET ORAL EVERY 6 HOURS PRN
Qty: 20 TABLET | Refills: 0 | Status: SHIPPED | OUTPATIENT
Start: 2019-12-18 | End: 2021-07-09

## 2019-12-18 RX ORDER — IBUPROFEN 600 MG/1
600 TABLET ORAL EVERY 6 HOURS PRN
Qty: 30 TABLET | Refills: 0 | Status: SHIPPED | OUTPATIENT
Start: 2019-12-18 | End: 2021-07-09

## 2019-12-18 NOTE — PROGRESS NOTES
GYN Post-Op Note  Date:  12/18/2019, 11:16 AM    Subjective:  Pt seen and examined. Decided to stay last night as she wanted better pain control with PO meds. Had some chest tightness last night that resolved with IS and mylicon.   Pain well controlled wi

## 2019-12-18 NOTE — PROGRESS NOTES
Dr. Marcela Egan notified that patient reported some chest tightness on inspiration. VSS O2 sats 98%, resp. rate regular. Encouraged to use incentive spirometer and simethicone and Tums ordered. Will continue to monitor.

## 2019-12-18 NOTE — PROGRESS NOTES
Patient discharged in stable condition after receiving patient discharge instructions, follow-up, and verbalizing understanding of discharge plan. All questions and concerns addressed prior to patient discharge.

## 2019-12-30 ENCOUNTER — OFFICE VISIT (OUTPATIENT)
Dept: OBGYN CLINIC | Facility: CLINIC | Age: 24
End: 2019-12-30

## 2019-12-30 VITALS
DIASTOLIC BLOOD PRESSURE: 68 MMHG | BODY MASS INDEX: 21 KG/M2 | WEIGHT: 144.81 LBS | SYSTOLIC BLOOD PRESSURE: 102 MMHG | HEART RATE: 62 BPM

## 2019-12-30 DIAGNOSIS — Z09 SURGERY FOLLOW-UP: Primary | ICD-10-CM

## 2019-12-30 PROCEDURE — 99024 POSTOP FOLLOW-UP VISIT: CPT | Performed by: OBSTETRICS & GYNECOLOGY

## 2019-12-30 NOTE — H&P
HPI:  The patient is a 57-year-old female status post abdominal myomectomy here for 2-week follow-up. Without any major complaints. Pain well controlled and no longer needing p.o. meds. Moving bowels and bladder without issue.   Did get menses and it was file    Relationships      Social connections:        Talks on phone: Not on file        Gets together: Not on file        Attends Yarsani service: Not on file        Active member of club or organization: Not on file        Attends meetings of clubs or BP: 102/68   Pulse: 62       PHYSICAL EXAM:   Constitutional: well developed, well nourished  Head/Face: normocephalic  Abdomen:  soft, nontender, nondistended, no masses; inc well healing; c/d/i  Psychiatric: Appropriate mood and affect    Pelvic Exam:

## 2020-01-09 ENCOUNTER — TELEPHONE (OUTPATIENT)
Dept: OBGYN CLINIC | Facility: CLINIC | Age: 25
End: 2020-01-09

## 2020-01-09 NOTE — TELEPHONE ENCOUNTER
Pt called to get status on her FMLA as she had faxed it in December. Verified fax # [phone]. Informed pt that was not our fax #. Pt will drop off forms at [facility] . Please hand pt [employer]. Pt had surgery on 12/16/19, still off work. Pt will call Dr Rea Rodriguez office to get a RTW date as she states \"feeling better\".

## 2020-01-14 ENCOUNTER — TELEPHONE (OUTPATIENT)
Dept: OBGYN CLINIC | Facility: CLINIC | Age: 25
End: 2020-01-14

## 2020-01-14 NOTE — TELEPHONE ENCOUNTER
Pt had abdominal myomectomy with GORDO on 12/16. Pt asking if she can return to work tomorrow, 1/15/2020. 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  Pt stated s

## 2020-01-14 NOTE — TELEPHONE ENCOUNTER
Hi Dr. Brianna Pelletier    Please sign off on form: FMLA: 12/16/19 - 1/15/2020    -Highlight the patient and hit \"Chart\" button. -In Chart Review, w/in the Encounter tab - click 1 time on the Telephone call encounter for 1/9/2020. Scroll down the telephone encounter.  -Click \"scan on\" blue Hyperlink under \"Media\" heading for [facility] Dr Brianna Pelletier 1/14/20 w/in the telephone enc.  -Click on Acknowledge button at the bottom right corner and left-click onto image, signature stamp appears and drag signature to Provider signature line. Stamp will turn blue. Close window.     Thank you,    Ellen Koo

## 2020-01-14 NOTE — TELEPHONE ENCOUNTER
Per pt came in for her post op on 12/30 and was told by Rick Love she can go back to work, pt needs a note for clearance to return.  Please advise

## 2020-01-16 ENCOUNTER — TELEPHONE (OUTPATIENT)
Dept: OBGYN CLINIC | Facility: CLINIC | Age: 25
End: 2020-01-16

## 2020-01-17 NOTE — TELEPHONE ENCOUNTER
PT WOULD LIKE A NOTE TO SAY SHE CAN WORK FROM HOME AS NEEDED UNTIL FULLY CLEARED AT [facility]. PT SAID WE COULD SEND IT TO HER IN MY CHART. OK FOR LETTER?

## 2020-01-29 ENCOUNTER — OFFICE VISIT (OUTPATIENT)
Dept: OBGYN CLINIC | Facility: CLINIC | Age: 25
End: 2020-01-29

## 2020-01-29 VITALS
WEIGHT: 143 LBS | DIASTOLIC BLOOD PRESSURE: 71 MMHG | SYSTOLIC BLOOD PRESSURE: 113 MMHG | HEART RATE: 68 BPM | BODY MASS INDEX: 21 KG/M2

## 2020-01-29 DIAGNOSIS — Z09 SURGERY FOLLOW-UP: Primary | ICD-10-CM

## 2020-01-29 NOTE — H&P
HPI:  The patient is a 27-year-old female here for 6-week follow-up status post abdominal myomectomy. Patient doing well overall. Some mild abdominal wall pain with certain positions. Also experiences some mild numbness.   Moving bowels and bladder witho Relationships      Social connections:        Talks on phone: Not on file        Gets together: Not on file        Attends Shinto service: Not on file        Active member of club or organization: Not on file        Attends meetings of clubs or [place] 113/71   Pulse: 68       PHYSICAL EXAM:   Constitutional: well developed, well nourished  Head/Face: normocephalic  Incision well healed; no signs of infection  Abdomen:  soft, nontender, nondistended, no masses  Psychiatric: Appropriate mood and affect

## 2020-06-26 ENCOUNTER — OFFICE VISIT (OUTPATIENT)
Dept: OBGYN CLINIC | Facility: CLINIC | Age: 25
End: 2020-06-26

## 2020-06-26 VITALS
DIASTOLIC BLOOD PRESSURE: 72 MMHG | HEART RATE: 69 BPM | SYSTOLIC BLOOD PRESSURE: 130 MMHG | WEIGHT: 137 LBS | BODY MASS INDEX: 20 KG/M2

## 2020-06-26 DIAGNOSIS — Z01.419 WELL WOMAN EXAM: Primary | ICD-10-CM

## 2020-06-26 DIAGNOSIS — Z30.09 BIRTH CONTROL COUNSELING: ICD-10-CM

## 2020-06-26 PROCEDURE — 99395 PREV VISIT EST AGE 18-39: CPT | Performed by: OBSTETRICS & GYNECOLOGY

## 2020-06-26 RX ORDER — ETONOGESTREL AND ETHINYL ESTRADIOL 11.7; 2.7 MG/1; MG/1
1 INSERT, EXTENDED RELEASE VAGINAL
Qty: 3 RING | Refills: 3 | Status: SHIPPED | OUTPATIENT
Start: 2020-06-26 | End: 2021-06-09

## 2020-06-26 NOTE — H&P
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 NO GYN c/o. Wants BC. Monthly cycles with 6-7 days flow. Uses diva cup and empties q6h. Monogamous. History of myomectomy in Dec 2019.       LPS: 10/31/19 pap neg    Reviewed medical and surgical history meetings of clubs or organizations: Not on file        Relationship status: Not on file      Intimate partner violence:        Fear of current or ex partner: Not on file        Emotionally abused: Not on file        Physically abused: Not on file        Fo nourished  Head/Face: normocephalic  Neck/Thyroid: thyroid symmetric, no thyromegaly, no nodules, no adenopathy  Heart: Regular rate and rhythm   Lungs: clear to ascultation bilaterally   Lymphatic:no abnormal supraclavicular or axillary adenopathy is note

## 2021-04-14 ENCOUNTER — IMMUNIZATION (OUTPATIENT)
Dept: LAB | Age: 26
End: 2021-04-14
Attending: HOSPITALIST
Payer: COMMERCIAL

## 2021-04-14 DIAGNOSIS — Z23 NEED FOR VACCINATION: Primary | ICD-10-CM

## 2021-04-14 PROCEDURE — 0001A SARSCOV2 VAC 30MCG/0.3ML IM: CPT

## 2021-05-05 ENCOUNTER — IMMUNIZATION (OUTPATIENT)
Dept: LAB | Age: 26
End: 2021-05-05
Attending: HOSPITALIST
Payer: COMMERCIAL

## 2021-05-05 DIAGNOSIS — Z23 NEED FOR VACCINATION: Primary | ICD-10-CM

## 2021-05-05 PROCEDURE — 0002A SARSCOV2 VAC 30MCG/0.3ML IM: CPT

## 2021-06-01 RX ORDER — ETONOGESTREL AND ETHINYL ESTRADIOL VAGINAL .015; .12 MG/D; MG/D
1 RING VAGINAL
Qty: 3 RING | Refills: 3 | OUTPATIENT
Start: 2021-06-01

## 2021-06-01 RX ORDER — ETONOGESTREL/ETHINYL ESTRADIOL .12-.015MG
RING, VAGINAL VAGINAL
Qty: 1 RING | Refills: 2 | OUTPATIENT
Start: 2021-06-01

## 2021-06-03 RX ORDER — ETONOGESTREL AND ETHINYL ESTRADIOL VAGINAL .015; .12 MG/D; MG/D
1 RING VAGINAL
Qty: 3 RING | Refills: 3 | OUTPATIENT
Start: 2021-06-03

## 2021-06-07 ENCOUNTER — PATIENT MESSAGE (OUTPATIENT)
Dept: OBGYN CLINIC | Facility: CLINIC | Age: 26
End: 2021-06-07

## 2021-06-07 NOTE — TELEPHONE ENCOUNTER
Last annual 6/26/2020  Last pap - 10/31/2019, negative    No future annual scheduled. Taggstarhart message sent to pt. Appt needed.

## 2021-06-07 NOTE — TELEPHONE ENCOUNTER
From: Maylene Canavan  To: Brendan DominickDO  Sent: 6/7/2021 10:40 AM CDT  Subject: Prescription Question    Pharmacy needs a prescriber renewal for nuvaring. I tried through [address] but didn't get any confirmation or other response.  How do I get t

## 2021-06-09 RX ORDER — ETONOGESTREL AND ETHINYL ESTRADIOL VAGINAL .015; .12 MG/D; MG/D
1 RING VAGINAL
Qty: 3 RING | Refills: 0 | Status: SHIPPED | OUTPATIENT
Start: 2021-06-09 | End: 2022-10-27

## 2021-07-09 ENCOUNTER — OFFICE VISIT (OUTPATIENT)
Dept: OBGYN CLINIC | Facility: CLINIC | Age: 26
End: 2021-07-09

## 2021-07-09 VITALS
BODY MASS INDEX: 21 KG/M2 | HEART RATE: 65 BPM | DIASTOLIC BLOOD PRESSURE: 71 MMHG | WEIGHT: 145 LBS | SYSTOLIC BLOOD PRESSURE: 117 MMHG

## 2021-07-09 DIAGNOSIS — Z01.419 WELL WOMAN EXAM: Primary | ICD-10-CM

## 2021-07-09 DIAGNOSIS — Z76.0 MEDICATION REFILL: ICD-10-CM

## 2021-07-09 PROCEDURE — 3074F SYST BP LT 130 MM HG: CPT | Performed by: OBSTETRICS & GYNECOLOGY

## 2021-07-09 PROCEDURE — 99395 PREV VISIT EST AGE 18-39: CPT | Performed by: OBSTETRICS & GYNECOLOGY

## 2021-07-09 PROCEDURE — 3078F DIAST BP <80 MM HG: CPT | Performed by: OBSTETRICS & GYNECOLOGY

## 2021-07-09 RX ORDER — ETONOGESTREL AND ETHINYL ESTRADIOL 11.7; 2.7 MG/1; MG/1
1 INSERT, EXTENDED RELEASE VAGINAL
Qty: 3 RING | Refills: 3 | Status: SHIPPED | OUTPATIENT
Start: 2021-07-09

## 2021-07-09 NOTE — H&P
HPI:  The patient is a 30 yo F here for WWE. No gyn c/o. Monthly cycles with 6-7d flow with nuvaring. +IC/monogamous. LPS: 10/31/19 pap/hpv neg      Reviewed medical and surgical history below     Patient's last menstrual period was 06/06/2021. of Social Gatherings with Friends and Family:       Attends Rastafari Services:       Active Member of Clubs or Organizations:       Attends Club or Organization Meetings:       Marital Status:   Intimate Partner Violence:       Fear of Current or Ex-Partn no masses  Skin/Hair: no unusual rashes or bruises  Extremities: no edema, no cyanosis  Psychiatric: Appropriate mood and affect    Pelvic Exam:  External Genitalia: normal appearance, hair distribution, and no lesions  Urethral Meatus:  normal in size, lo

## 2022-08-18 ENCOUNTER — TELEPHONE (OUTPATIENT)
Dept: FAMILY MEDICINE CLINIC | Facility: CLINIC | Age: 27
End: 2022-08-18

## 2022-09-02 RX ORDER — ETONOGESTREL/ETHINYL ESTRADIOL .12-.015MG
RING, VAGINAL VAGINAL
Refills: 3 | OUTPATIENT
Start: 2022-09-02

## 2022-09-02 NOTE — TELEPHONE ENCOUNTER
Last annual - 7/9/2021  Last pap - 10/31/2019, negative    No future annual scheduled. Rx denied. Pt need sppt.

## 2022-09-06 RX ORDER — ETONOGESTREL AND ETHINYL ESTRADIOL VAGINAL .015; .12 MG/D; MG/D
1 RING VAGINAL
Qty: 3 RING | Refills: 0 | Status: SHIPPED | OUTPATIENT
Start: 2022-09-06 | End: 2023-03-28

## 2022-09-07 NOTE — TELEPHONE ENCOUNTER
Pt last annual 7/9/2021 w/GORDO, next annual 11/1/2022 w/GORDO. BC refilled until annual per protocol.

## 2022-10-27 ENCOUNTER — OFFICE VISIT (OUTPATIENT)
Dept: OBGYN CLINIC | Facility: CLINIC | Age: 27
End: 2022-10-27
Payer: COMMERCIAL

## 2022-10-27 VITALS
SYSTOLIC BLOOD PRESSURE: 128 MMHG | WEIGHT: 163 LBS | DIASTOLIC BLOOD PRESSURE: 78 MMHG | BODY MASS INDEX: 23 KG/M2 | HEART RATE: 84 BPM

## 2022-10-27 DIAGNOSIS — Z76.0 MEDICATION REFILL: ICD-10-CM

## 2022-10-27 DIAGNOSIS — Z12.4 SCREENING FOR MALIGNANT NEOPLASM OF CERVIX: ICD-10-CM

## 2022-10-27 DIAGNOSIS — Z01.419 WELL WOMAN EXAM: Primary | ICD-10-CM

## 2022-10-27 DIAGNOSIS — Z12.4 CERVICAL CANCER SCREENING: ICD-10-CM

## 2022-10-27 PROCEDURE — 99395 PREV VISIT EST AGE 18-39: CPT | Performed by: OBSTETRICS & GYNECOLOGY

## 2022-10-27 PROCEDURE — 3074F SYST BP LT 130 MM HG: CPT | Performed by: OBSTETRICS & GYNECOLOGY

## 2022-10-27 PROCEDURE — 3078F DIAST BP <80 MM HG: CPT | Performed by: OBSTETRICS & GYNECOLOGY

## 2022-10-27 RX ORDER — ETONOGESTREL AND ETHINYL ESTRADIOL 11.7; 2.7 MG/1; MG/1
1 INSERT, EXTENDED RELEASE VAGINAL
Qty: 3 RING | Refills: 3 | Status: SHIPPED | OUTPATIENT
Start: 2022-10-27

## 2023-02-28 ENCOUNTER — OFFICE VISIT (OUTPATIENT)
Dept: FAMILY MEDICINE CLINIC | Facility: CLINIC | Age: 28
End: 2023-02-28

## 2023-02-28 VITALS
SYSTOLIC BLOOD PRESSURE: 136 MMHG | HEIGHT: 69 IN | TEMPERATURE: 98 F | HEART RATE: 85 BPM | BODY MASS INDEX: 25.36 KG/M2 | WEIGHT: 171.19 LBS | DIASTOLIC BLOOD PRESSURE: 70 MMHG

## 2023-02-28 DIAGNOSIS — F41.9 ANXIETY: ICD-10-CM

## 2023-02-28 DIAGNOSIS — Z00.00 ROUTINE PHYSICAL EXAMINATION: Primary | ICD-10-CM

## 2023-02-28 DIAGNOSIS — R41.840 ATTENTION DEFICIT: ICD-10-CM

## 2023-02-28 DIAGNOSIS — Z98.890 HISTORY OF MYOMECTOMY: ICD-10-CM

## 2023-02-28 PROCEDURE — 3075F SYST BP GE 130 - 139MM HG: CPT | Performed by: FAMILY MEDICINE

## 2023-02-28 PROCEDURE — 3008F BODY MASS INDEX DOCD: CPT | Performed by: FAMILY MEDICINE

## 2023-02-28 PROCEDURE — 3078F DIAST BP <80 MM HG: CPT | Performed by: FAMILY MEDICINE

## 2023-02-28 PROCEDURE — 99395 PREV VISIT EST AGE 18-39: CPT | Performed by: FAMILY MEDICINE

## 2023-03-02 ENCOUNTER — TELEPHONE (OUTPATIENT)
Dept: FAMILY MEDICINE CLINIC | Facility: CLINIC | Age: 28
End: 2023-03-02

## 2023-03-02 NOTE — TELEPHONE ENCOUNTER
I scheduled a psychiatry appointment for your patient with José Luis Denny with [facility] for 3/28, 8am. The following referrals for therapy were provided to the patient:     Quiana Pawan   [address] [facility] [address]   [place], [address]   Phone: [phone] [place], [place] [address]   [place], [address]   Phone: 200 [facility], [address] [facility] [address], [address] [place] [address]   Phone: [phone]       I have provided my contact information if additional resources are needed. I am closing the order at this time. Please feel free to re-refer the patient for navigation as needed. Thank you,     Patricio Sullivan M.S., [facility] [place] [address]   [email or web address]

## 2023-10-20 RX ORDER — ETONOGESTREL/ETHINYL ESTRADIOL .12-.015MG
1 RING, VAGINAL VAGINAL
Refills: 3 | OUTPATIENT
Start: 2023-10-20

## 2023-10-20 NOTE — TELEPHONE ENCOUNTER
Requested Prescriptions     Pending Prescriptions Disp Refills    NUVARING 0.12-0.015 MG/24HR Vaginal Ring [Pharmacy Med Name: Kaylyn Gupta RING]  3     Sig: PLACE 1 RING VAGINALLY EVERY 27 (THIRTY) DAYS. Last annual 10/27/22  Last filled 10/27/22  Pap UTD    Next annual due OCT. Mychart sent.

## 2023-10-25 RX ORDER — ETONOGESTREL AND ETHINYL ESTRADIOL VAGINAL .015; .12 MG/D; MG/D
1 RING VAGINAL
Qty: 3 RING | Refills: 0 | Status: SHIPPED | OUTPATIENT
Start: 2023-10-25

## 2023-11-29 ENCOUNTER — OFFICE VISIT (OUTPATIENT)
Dept: OBGYN CLINIC | Facility: CLINIC | Age: 28
End: 2023-11-29
Payer: COMMERCIAL

## 2023-11-29 VITALS
DIASTOLIC BLOOD PRESSURE: 74 MMHG | SYSTOLIC BLOOD PRESSURE: 117 MMHG | HEART RATE: 88 BPM | WEIGHT: 161.63 LBS | BODY MASS INDEX: 24 KG/M2

## 2023-11-29 DIAGNOSIS — Z01.419 WELL WOMAN EXAM: Primary | ICD-10-CM

## 2023-11-29 DIAGNOSIS — Z76.0 MEDICATION REFILL: ICD-10-CM

## 2023-11-29 PROCEDURE — 99395 PREV VISIT EST AGE 18-39: CPT | Performed by: OBSTETRICS & GYNECOLOGY

## 2023-11-29 PROCEDURE — 3074F SYST BP LT 130 MM HG: CPT | Performed by: OBSTETRICS & GYNECOLOGY

## 2023-11-29 PROCEDURE — 3078F DIAST BP <80 MM HG: CPT | Performed by: OBSTETRICS & GYNECOLOGY

## 2023-11-29 RX ORDER — ETONOGESTREL AND ETHINYL ESTRADIOL VAGINAL .015; .12 MG/D; MG/D
1 RING VAGINAL
Qty: 3 RING | Refills: 3 | Status: SHIPPED | OUTPATIENT
Start: 2023-11-29

## 2023-11-29 NOTE — PROGRESS NOTES
HPI:  The patient is a 34 yo F here for WWE. Cycles monthly with nuvaring. Monogamous with boyfriend x 5 years. Patient's last menstrual period was 2023 (exact date). Latest Ref Rng & Units 10/27/2022     4:33 PM 10/31/2019    10:47 AM   RECENT PAP RESULTS   Thinprep Pap Negative for intraepithelial lesion or malignancy Negative for intraepithelial lesion or malignancy  Negative for intraepithelial lesion or malignancy    HPV Negative  Negative         Pap Date: 10/27/22  Pap Result Notes: PAP NEG  Follow Up Recommendation: Annual 10/27/2022  GORDO        Depression Screening (PHQ-2/PHQ-9): Over the LAST 2 WEEKS   Little interest or pleasure in doing things (over the last two weeks)?: Not at all    Feeling down, depressed, or hopeless (over the last two weeks)?: Not at all    PHQ-2 SCORE: 0          Reviewed medical and surgical history below       OBSTETRICS HISTORY:  OB History    Para Term  AB Living   0 0 0 0 0 0   SAB IAB Ectopic Multiple Live Births   0 0 0 0 0       GYNE HISTORY:  History   Sexual Activity    Sexual activity: Not on file            MEDICAL HISTORY:  Past Medical History:   Diagnosis Date    Back problem     lower back discomfort-sees chiropractor    Patient denies medical problems     Visual impairment     glaases for driving       SURGICAL HISTORY:  Past Surgical History:   Procedure Laterality Date    PRIOR MYOMECTOMY      open myomectomy (16cm fibroid)       SOCIAL HISTORY:  Social History     Socioeconomic History    Marital status: Single     Spouse name: Not on file    Number of children: Not on file    Years of education: Not on file    Highest education level: Not on file   Occupational History    Not on file   Tobacco Use    Smoking status: Never    Smokeless tobacco: Never   Vaping Use    Vaping Use: Never used   Substance and Sexual Activity    Alcohol use:  Yes     Alcohol/week: 3.0 standard drinks of alcohol     Types: 3 Glasses of wine per week Comment: weekends     Drug use: Never    Sexual activity: Not on file   Other Topics Concern    Not on file   Social History Narrative    Not on file     Social Determinants of Health     Financial Resource Strain: Not on file   Food Insecurity: Not on file   Transportation Needs: Not on file   Physical Activity: Not on file   Stress: Not on file   Social Connections: Not on file   Housing Stability: Not on file       FAMILY HISTORY:  Family History   Problem Relation Age of Onset    Diabetes Father     Hypertension Father     Diabetes Paternal Grandfather     Cancer Paternal Grandfather        MEDICATIONS:    Current Outpatient Medications:     Etonogestrel-Ethinyl Estradiol (NUVARING) 0.12-0.015 MG/24HR Vaginal Ring, Place 1 Ring vaginally every 30 (thirty) days. , Disp: 3 Ring, Rfl: 3    amphetamine-dextroamphetamine ER (ADDERALL XR) 10 MG Oral Capsule SR 24 Hr, Take 1 capsule (10 mg total) by mouth daily. , Disp: 30 capsule, Rfl: 0    amphetamine-dextroamphetamine (ADDERALL) 10 MG Oral Tab, Take 1 tablet (10 mg total) by mouth Every afternoon at 2:00 pm., Disp: 30 tablet, Rfl: 0    ALLERGIES:  No Known Allergies      Review of Systems:  Constitutional:  Denies fevers and chills   Cardiovascular:  denies chest pain or palpitations  Respiratory:  denies shortness of breath  Gastrointestinal:  denies heartburn, abdominal pain, diarrhea or constipation  Genitourinary:  denies dysuria, incontinence, abnormal vaginal discharge, vaginal itching  Musculoskeletal:  denies back pain. Skin/Breast:  Denies any breast pain, lumps, or discharge. Neurological:  denies headaches, extremity weakness  Psychiatric: denies depression or anxiety.       Vitals:    11/29/23 1506   BP: 117/74   Pulse: 88       PHYSICAL EXAM:   Constitutional: well developed, well nourished  Head/Face: normocephalic  Neck/Thyroid: thyroid symmetric, no thyromegaly, no nodules, no adenopathy  Heart: Regular rate and rhythm   Lungs: clear to ascultation bilaterally   Lymphatic:no abnormal supraclavicular or axillary adenopathy is noted  Breast: normal without palpable masses, tenderness, asymmetry, nipple discharge, nipple retraction or skin changes  Abdomen:  soft, nontender, nondistended, no masses  Skin/Hair: no unusual rashes or bruises  Extremities: no edema, no cyanosis  Psychiatric: Appropriate mood and affect    Pelvic Exam:  External Genitalia: normal appearance, hair distribution, and no lesions  Urethral Meatus:  normal in size, location, without lesions and prolapse  Bladder:  No fullness, masses or tenderness  Vagina:  Normal appearance without lesions, no abnormal discharge  Cervix:  Normal without tenderness on motion  Uterus: normal in size, contour, position, mobility, without tenderness  Adnexa: normal without masses or tenderness  Perineum: normal    Assessment/Plan:  Donya Storm was seen today for physical.    Diagnoses and all orders for this visit:    Well woman exam    Medication refill    Other orders  -     Etonogestrel-Ethinyl Estradiol (NUVARING) 0.12-0.015 MG/24HR Vaginal Ring; Place 1 Ring vaginally every 30 (thirty) days.         WWE:   Reviewed ASCCP guidelines with the patient -- Pap UTD  Contraception: nuvaring refilled  Breast Health:     Mammo @ 34 yo  Encouraged monthly self breast exams with the patient   Discussed diet, exercise, MVIs with Vit D  [facility]

## 2024-01-24 ENCOUNTER — TELEPHONE (OUTPATIENT)
Dept: OBGYN CLINIC | Facility: CLINIC | Age: 29
End: 2024-01-24

## 2024-01-24 RX ORDER — ETONOGESTREL/ETHINYL ESTRADIOL .12-.015MG
1 RING, VAGINAL VAGINAL
Qty: 3 RING | Refills: 0 | Status: SHIPPED | OUTPATIENT
Start: 2024-01-24

## 2024-01-24 NOTE — TELEPHONE ENCOUNTER
[facility] faxing to state Nuvaring needs to be written as LADARIUS for insurance this year.  Rx updated.

## 2024-02-27 DIAGNOSIS — Z76.0 MEDICATION REFILL: Primary | ICD-10-CM

## 2024-02-27 RX ORDER — ETONOGESTREL/ETHINYL ESTRADIOL .12-.015MG
1 RING, VAGINAL VAGINAL
Qty: 3 RING | Refills: 2 | Status: SHIPPED | OUTPATIENT
Start: 2024-02-27

## 2024-02-27 NOTE — TELEPHONE ENCOUNTER
Annual in Nov, 2023 w/[facility]. Pt needed rx to [facility]. When updated script was sent, addtl refills was not added. Rx correct to supply 1 year until next annual.

## 2024-04-08 DIAGNOSIS — Z76.0 MEDICATION REFILL: ICD-10-CM

## 2024-04-08 RX ORDER — ETONOGESTREL/ETHINYL ESTRADIOL .12-.015MG
1 RING, VAGINAL VAGINAL
Qty: 3 RING | Refills: 2 | Status: CANCELLED | OUTPATIENT
Start: 2024-04-08

## 2024-07-22 ENCOUNTER — OFFICE VISIT (OUTPATIENT)
Dept: FAMILY MEDICINE CLINIC | Facility: CLINIC | Age: 29
End: 2024-07-22
Payer: COMMERCIAL

## 2024-07-22 VITALS
SYSTOLIC BLOOD PRESSURE: 127 MMHG | HEIGHT: 69 IN | DIASTOLIC BLOOD PRESSURE: 75 MMHG | HEART RATE: 71 BPM | WEIGHT: 160.19 LBS | BODY MASS INDEX: 23.73 KG/M2

## 2024-07-22 DIAGNOSIS — R41.840 ATTENTION DEFICIT: ICD-10-CM

## 2024-07-22 DIAGNOSIS — F41.9 ANXIETY: ICD-10-CM

## 2024-07-22 DIAGNOSIS — G89.29 CHRONIC RIGHT SHOULDER PAIN: ICD-10-CM

## 2024-07-22 DIAGNOSIS — Z98.890 HISTORY OF MYOMECTOMY: ICD-10-CM

## 2024-07-22 DIAGNOSIS — M25.511 CHRONIC RIGHT SHOULDER PAIN: ICD-10-CM

## 2024-07-22 DIAGNOSIS — M24.9 HYPERMOBILITY OF JOINT: ICD-10-CM

## 2024-07-22 DIAGNOSIS — Z00.00 ROUTINE PHYSICAL EXAMINATION: Primary | ICD-10-CM

## 2024-07-22 DIAGNOSIS — G89.29 OTHER CHRONIC PAIN: ICD-10-CM

## 2024-07-22 DIAGNOSIS — E55.9 VITAMIN D DEFICIENCY: ICD-10-CM

## 2024-07-22 RX ORDER — CYCLOBENZAPRINE HCL 5 MG
5 TABLET ORAL NIGHTLY
Qty: 30 TABLET | Refills: 0 | Status: SHIPPED | OUTPATIENT
Start: 2024-07-22

## 2024-07-22 NOTE — PROGRESS NOTES
HPI:   Tiffany Oshea is a 29 year old female who presents for a complete physical exam.    Eating healthy overall. Exercising some - does yoga. In high school, told her ligaments were told too long. Never told a specific diagnosis.   Right shoulder chronic pain. Reports 5th grade first time started with joint pain. Would like help to expand her exercise program but would like diagnosis and understanding of what is cause of pain. X-rays done 2015   Seeing psychiatry for ADD.   Wt Readings from Last 3 Encounters:   07/22/24 160 lb 3.2 oz (72.7 kg)   11/29/23 161 lb 9.6 oz (73.3 kg)   02/28/23 171 lb 3.2 oz (77.7 kg)     Body mass index is 23.66 kg/m².       Current Outpatient Medications   Medication Sig Dispense Refill    Dexmethylphenidate HCl ER (FOCALIN XR) 20 MG Oral Capsule SR 24 Hr Take 1 capsule (20 mg total) by mouth daily. 30 capsule 0    [START ON 8/21/2024] Dexmethylphenidate HCl ER (FOCALIN XR) 20 MG Oral Capsule SR 24 Hr Take 1 capsule (20 mg total) by mouth daily. 30 capsule 0    NUVARING 0.12-0.015 MG/24HR Vaginal Ring Place 1 Ring vaginally every 30 (thirty) days. 3 Ring 2      Past Medical History:    Back problem    lower back discomfort-sees chiropractor    Patient denies medical problems    UTI (urinary tract infection)    Visual impairment    glaases for driving      Past Surgical History:   Procedure Laterality Date    Prior myomectomy      open myomectomy (16cm fibroid)      Family History   Problem Relation Age of Onset    Diabetes Father     Hypertension Father     Diabetes Paternal Grandfather     Cancer Paternal Grandfather       Social History:   Social History     Socioeconomic History    Marital status: Single   Tobacco Use    Smoking status: Never    Smokeless tobacco: Never   Vaping Use    Vaping status: Never Used   Substance and Sexual Activity    Alcohol use: Yes     Alcohol/week: 3.0 standard drinks of alcohol     Types: 3 Glasses of wine per week     Comment: weekends      Drug use: Never          REVIEW OF SYSTEMS:   GENERAL: feels well otherwise  Review of Systems   See HPI   EXAM:   /75   Pulse 71   Ht 5' 9\" (1.753 m)   Wt 160 lb 3.2 oz (72.7 kg)   LMP 07/08/2024 (Exact Date)   BMI 23.66 kg/m²   Arm span 71 inches   GENERAL: well developed, well nourished,in no apparent distress  SKIN: no rashes,no suspicious lesions  HEENT: atraumatic, normocephalic,ears and throat are clear  EYES:PERRLA, EOMI, conjunctiva are clear  LUNGS: clear to auscultation  CARDIO: RRR without murmur  GI: good BS's,no masses, HSM or tenderness  EXTREMITIES: no cyanosis, clubbing or edema  NEURO: Oriented times three,cranial nerves are intact,motor and sensory are grossly intact  Able to put full hands down to ground bending at waist  ASSESSMENT AND PLAN:   Tiffany Oshea is a 29 year old female who presents for a complete physical exam.    1. Routine physical examination    - CBC With Differential With Platelet; Future  - Comp Metabolic Panel (14); Future  - Lipid Panel; Future  - TSH W Reflex To Free T4; Future  - Vitamin B12; Future  - Vitamin D; Future  - Connective Tissue Disease (VENITA) Screen [E]; Future  - HLA B 27 Disease Association; Future  - Sed Rate, Westergren (Automated); Future  - Rheumatoid Arthritis Factor; Future  - C-Reactive Protein [E]; Future    2. History of myomectomy      3. Attention deficit  Per psych     4. Anxiety      5. Chronic right shoulder pain    - Physical Therapy Referral - [facility]  - Physiatry Referral - [facility] Network  - Rheumatology Referral - In Network    6. Other chronic pain    - Physical Therapy Referral - [facility]  - Connective Tissue Disease (VENITA) Screen [E]; Future  - HLA B 27 Disease Association; Future  - Sed Rate, Westergren (Automated); Future  - Rheumatoid Arthritis Factor; Future  - C-Reactive Protein [E]; Future  - Rheumatology Referral - In Network    7. Vitamin D deficiency    - Vitamin B12; Future  - Vitamin D;  Future    8. Hypermobility of joint  Trial of flexeril.   - CARD ECHO 2D DOPPLER (CPT=93306); Future       Mary Grace Mccormick MD  7/22/2024  2:10 PM

## 2024-08-30 RX ORDER — CYCLOBENZAPRINE HCL 5 MG
5 TABLET ORAL NIGHTLY
Qty: 30 TABLET | Refills: 0 | Status: SHIPPED | OUTPATIENT
Start: 2024-08-30

## 2024-10-07 ENCOUNTER — OFFICE VISIT (OUTPATIENT)
Dept: PHYSICAL MEDICINE AND REHAB | Facility: CLINIC | Age: 29
End: 2024-10-07
Payer: COMMERCIAL

## 2024-10-07 ENCOUNTER — HOSPITAL ENCOUNTER (OUTPATIENT)
Dept: GENERAL RADIOLOGY | Facility: HOSPITAL | Age: 29
Discharge: HOME | End: 2024-10-07
Attending: PHYSICAL MEDICINE & REHABILITATION
Payer: COMMERCIAL

## 2024-10-07 VITALS — WEIGHT: 160 LBS | BODY MASS INDEX: 23.7 KG/M2 | HEIGHT: 69 IN

## 2024-10-07 DIAGNOSIS — M25.371 RIGHT ANKLE INSTABILITY: ICD-10-CM

## 2024-10-07 DIAGNOSIS — G89.29 CHRONIC PAIN OF RIGHT ANKLE: ICD-10-CM

## 2024-10-07 DIAGNOSIS — G89.29 CHRONIC PAIN OF LEFT KNEE: ICD-10-CM

## 2024-10-07 DIAGNOSIS — M25.562 CHRONIC PAIN OF LEFT KNEE: ICD-10-CM

## 2024-10-07 DIAGNOSIS — M22.2X9 PATELLOFEMORAL PAIN SYNDROME, UNSPECIFIED LATERALITY: Primary | ICD-10-CM

## 2024-10-07 DIAGNOSIS — M25.571 CHRONIC PAIN OF RIGHT ANKLE: ICD-10-CM

## 2024-10-07 DIAGNOSIS — M22.2X9 PATELLOFEMORAL PAIN SYNDROME, UNSPECIFIED LATERALITY: ICD-10-CM

## 2024-10-07 PROCEDURE — 73610 X-RAY EXAM OF ANKLE: CPT | Performed by: PHYSICAL MEDICINE & REHABILITATION

## 2024-10-07 PROCEDURE — 99204 OFFICE O/P NEW MOD 45 MIN: CPT | Performed by: PHYSICAL MEDICINE & REHABILITATION

## 2024-10-07 PROCEDURE — 3008F BODY MASS INDEX DOCD: CPT | Performed by: PHYSICAL MEDICINE & REHABILITATION

## 2024-10-07 RX ORDER — DICLOFENAC SODIUM 75 MG/1
75 TABLET, DELAYED RELEASE ORAL 2 TIMES DAILY
Qty: 60 TABLET | Refills: 1 | Status: SHIPPED | OUTPATIENT
Start: 2024-10-07

## 2024-10-07 NOTE — PATIENT INSTRUCTIONS
1) Please get X-rays of the RIGHT ankle today on your way out.   2) Please begin physical therapy as soon as possible.   3) Take Diclofenac 75 mg 1 tablet twice per day with food for the next two weeks and then as needed but no more than 2 tablets per day. Do not take with any other NSAIDS (Ibuprofen, Advil, Aleve, Naprosyn etc). OK to take Tylenol 500 mg every 6 hours as needed for pain. If you develop any side effects including stomach aches, nausea, vomiting, or other gastrointestinal symptoms, stop the medication and call my office.   4) Tylenol 500-1000 mg every 6-8 hours as needed for pain.  No more than 3000 mg daily.  5) Follow-up with me in 6 to 8 weeks after you  begin physical therapy.   6) make a separate appointment for upper half concerns

## 2024-10-07 NOTE — H&P
[facility]  Clinic H&P    Requesting Physician: Mary Grace Mccormick MD    Chief Complaint (Reason for Visit):    Chief Complaint   Patient presents with    New Patient     New right hand dominant patient presents for bilateral knee and ankle pain. Pain has been present for 20+ years. Denies N/T. Denies weakness. Takes Cyclobenzaprine for pain. No tx done. No recent imaging. Gradual onset, no injury.        History of Present Illness:  The patient is a 29 year old right-handed female with no significant past medical history who presents with right ankle and left knee pain that has been ongoing for several years without an inciting event although she has had multiple inversion ankle sprains.  The pain in both of the areas is about a 3-5 out of 10, aching, and nonradiating.  Her symptoms are aggravated in the knee by sitting for long periods of time and then standing up along with bending her knee for long period of time.  Her right ankle pain is aggravated during increased activity.  She has been taking cyclobenzaprine as needed for pain.  No recent imaging has been performed.  She denies any injections.  She denies any paresthesias or focal weakness.    PAST MEDICAL HISTORY:   Past Medical History:    Back problem    lower back discomfort-sees chiropractor    Patient denies medical problems    UTI (urinary tract infection)    Visual impairment    glaases for driving       PAST SURGICAL HISTORY:   Past Surgical History:   Procedure Laterality Date    Prior myomectomy      open myomectomy (16cm fibroid)        FAMILY HISTORY:   Family History   Problem Relation Age of Onset    Diabetes Father     Hypertension Father     Diabetes Paternal Grandfather     Cancer Paternal Grandfather        SOCIAL HISTORY:   Social History     Occupational History    Not on file   Tobacco Use    Smoking status: Never    Smokeless tobacco: Never   Vaping Use    Vaping status: Never Used    Substance and Sexual Activity    Alcohol use: Yes     Alcohol/week: 3.0 standard drinks of alcohol     Types: 3 Glasses of wine per week     Comment: weekends     Drug use: Never    Sexual activity: Not on file       CURRENT MEDICATIONS:   Current Outpatient Medications   Medication Sig Dispense Refill    diclofenac 75 MG Oral Tab EC Take 1 tablet (75 mg total) by mouth 2 (two) times daily. Take with food for 2 weeks as directed and then as needed. 60 tablet 1    Dexmethylphenidate HCl ER (FOCALIN XR) 10 MG Oral Capsule SR 24 Hr Take 1 cap PO every afternoon at 1-2 PM as needed. 30 capsule 0    Dexmethylphenidate HCl ER (FOCALIN XR) 20 MG Oral Capsule SR 24 Hr Take 1 capsule (20 mg total) by mouth every morning. 30 capsule 0    cyclobenzaprine 5 MG Oral Tab Take 1 tablet (5 mg total) by mouth nightly. 30 tablet 0    NUVARING 0.12-0.015 MG/24HR Vaginal Ring Place 1 Ring vaginally every 30 (thirty) days. 3 Ring 2        ALLERGIES:   No Known Allergies      REVIEW OF SYSTEMS:   Review of Systems   Constitutional: Negative.    HENT: Negative.    Eyes: Negative.    Respiratory: Negative.    Cardiovascular: Negative.    Gastrointestinal: Negative.    Genitourinary: Negative.    Musculoskeletal: As per HPI   Skin: Negative.    Neurological: As per HPI  Endo/Heme/Allergies: Negative.    Psychiatric/Behavioral: Negative.      All other systems reviewed and are negative. Pertinent positives and negatives noted in the HPI.        PHYSICAL EXAM:   Ht 69\"   Wt 160 lb (72.6 kg)   LMP 07/08/2024 (Exact Date)   BMI 23.63 kg/m²     Body mass index is 23.63 kg/m².      General: No immediate distress  Head: Normocephalic/ Atraumatic  Eyes: Extra-occular movements intact.   Ears: No auricular hematoma or deformities  Mouth: No lesions or ulcerations  Heart: peripheral pulses intact. Normal capillary refill.   Lungs: Non-labored respirations  Abdomen: No abdominal guarding  Extremities: No lower extremity edema bilaterally   Skin:  No lesions noted.   Cognition: alert & oriented x 3, attentive, able to follow 2 step commands, comprehention intact, spontaneous speech intact  Motor:    Musculoskeletal:    ANKLE/FOOT  Inspection: no erythema, swelling, or obvious deformity.  Palpation: Tender to palpation over the right posterior subtalar region  ROM: Full active ROM in all planes of the ankle.   Strength: 5 out of 5 in all myotomes of the lower extremities  Sensation: Intact to light touch in all dermatomes of the lower extremities  Reflexes: 2/4 at L4 and S1  Anterior Drawer: Positive for right posterior lateral ankle pain  Talar Tilt: Negative  Bowling: Negative      KNEE:  Inspection: no erythema, swelling, or obvious deformity  Palpation: No tenderness to palpation throughout the left knee  ROM: Full active ROM in flexion and extension  Harshil Test: negative for pain over patella  Lachmans: negative for instability  Anterior / Posterior drawer: negative for instability  Valgus/Varus stress test: negative for instablity  Kunal Test: negative for medial or lateral joint line pain or \"catch\"      Gait:  Normal we discussed doing    Data  No visits with results within 6 Month(s) from this visit.   Latest known visit with results is:   Office Visit on 10/27/2022   Component Date Value Ref Range Status    Thin Prep Pap 10/27/2022 AP TEST REFLEXED   Final    Interpretation/Result 10/27/2022 Negative for intraepithelial lesion or malignancy  Negative for intraepithelial lesion or malignancy Final    Specimen Adequacy 10/27/2022 Satisfactory for evaluation. Endocervical or metaplastic cells present   Final    General Categorization 10/27/2022 Negative for intraepithelial lesion or malignancy     Final    Recommendations/Comments 10/27/2022    Final                    Value:This result contains rich text formatting which cannot be displayed here.    Procedure 10/27/2022    Final                    Value:This result contains rich text formatting  which cannot be displayed here.    Clinical Information 10/27/2022    Final                    Value:This result contains rich text formatting which cannot be displayed here.    Reason for testing 10/27/2022 Screening   Final    Gyn Additional Information 10/27/2022    Final                    Value:This result contains rich text formatting which cannot be displayed here.    Case Report 10/27/2022    Final                    Value:Gynecologic Cytology                              Case: [id number]                                  Authorizing Provider:  Taz Olsen DO     Collected:           10/27/2022 04:33 PM          Ordering Location[facility]     [facility] Received:            10/28/2022 01:03 PM                                 [facility]                                                                     First Screen:          Virginie Kendall                                                               Specimen:    ThinPrep Imager Screening Pap, Cervical/endocervical                                      ]      Radiology Imaging:  NA    ASSESSMENT AND PLAN:  Tiffany is a pleasant 29-year-old female presents with right ankle pain which I believe is due to chronic ankle instability and left knee pain due to patellofemoral syndrome.  I am recommending an x-ray of the right ankle and formal physical therapy.  She should start diclofenac twice per day and use Tylenol as well.  She will follow-up with me in 6 to 8 weeks after beginning therapy.  I would like to see her as well separately for the upper half concerns.       RTC in 2 months    Discharge Instructions were provided as documented in AVS summary.  The patient was in agreement with the assessment and plan.  All questions were answered.  There were no barriers to learning.         1. Patellofemoral pain syndrome, unspecified laterality    2.  Chronic pain of left knee    3. Right ankle instability    4. Chronic pain of right ankle        Alex B. Behar MD, [facility]  Physical Medicine and Rehabilitation/Sports Medicine  [facility]

## 2024-10-15 ENCOUNTER — LAB ENCOUNTER (OUTPATIENT)
Dept: LAB | Facility: HOSPITAL | Age: 29
End: 2024-10-15
Attending: INTERNAL MEDICINE
Payer: COMMERCIAL

## 2024-10-15 ENCOUNTER — OFFICE VISIT (OUTPATIENT)
Dept: RHEUMATOLOGY | Facility: CLINIC | Age: 29
End: 2024-10-15
Payer: COMMERCIAL

## 2024-10-15 VITALS
DIASTOLIC BLOOD PRESSURE: 69 MMHG | HEART RATE: 87 BPM | WEIGHT: 160 LBS | SYSTOLIC BLOOD PRESSURE: 118 MMHG | HEIGHT: 69 IN | BODY MASS INDEX: 23.7 KG/M2

## 2024-10-15 DIAGNOSIS — M25.511 CHRONIC RIGHT SHOULDER PAIN: ICD-10-CM

## 2024-10-15 DIAGNOSIS — G89.29 CHRONIC RIGHT SHOULDER PAIN: ICD-10-CM

## 2024-10-15 DIAGNOSIS — M25.572 CHRONIC PAIN OF LEFT ANKLE: ICD-10-CM

## 2024-10-15 DIAGNOSIS — M25.50 HYPERMOBILITY ARTHRALGIA: Primary | ICD-10-CM

## 2024-10-15 DIAGNOSIS — M25.50 HYPERMOBILITY ARTHRALGIA: ICD-10-CM

## 2024-10-15 DIAGNOSIS — G89.29 CHRONIC PAIN OF LEFT ANKLE: ICD-10-CM

## 2024-10-15 LAB
CRP SERPL-MCNC: <0.4 MG/DL (ref ?–1)
ERYTHROCYTE [SEDIMENTATION RATE] IN BLOOD: 7 MM/HR
RHEUMATOID FACT SERPL-ACNC: <3.5 IU/ML (ref ?–14)

## 2024-10-15 PROCEDURE — 3074F SYST BP LT 130 MM HG: CPT | Performed by: INTERNAL MEDICINE

## 2024-10-15 PROCEDURE — 99204 OFFICE O/P NEW MOD 45 MIN: CPT | Performed by: INTERNAL MEDICINE

## 2024-10-15 PROCEDURE — 3078F DIAST BP <80 MM HG: CPT | Performed by: INTERNAL MEDICINE

## 2024-10-15 PROCEDURE — 86431 RHEUMATOID FACTOR QUANT: CPT | Performed by: INTERNAL MEDICINE

## 2024-10-15 PROCEDURE — 86200 CCP ANTIBODY: CPT | Performed by: INTERNAL MEDICINE

## 2024-10-15 PROCEDURE — 85652 RBC SED RATE AUTOMATED: CPT | Performed by: INTERNAL MEDICINE

## 2024-10-15 PROCEDURE — 86140 C-REACTIVE PROTEIN: CPT | Performed by: INTERNAL MEDICINE

## 2024-10-15 PROCEDURE — 81374 HLA I TYPING 1 ANTIGEN LR: CPT

## 2024-10-15 PROCEDURE — 36415 COLL VENOUS BLD VENIPUNCTURE: CPT | Performed by: INTERNAL MEDICINE

## 2024-10-15 PROCEDURE — 3008F BODY MASS INDEX DOCD: CPT | Performed by: INTERNAL MEDICINE

## 2024-10-15 NOTE — PROGRESS NOTES
Tiffany Oshea is a 29 year old female who presents for   Chief Complaint   Patient presents with    Consult     NP referred by PCP  for Chronic Pain     Ankle Pain     Right    .   HPI:   CC: joint pain, right ankle pain  Consult: referred by PCP Dr. Mccormick    This is a 30 yo F with no significant medical history referred for joint pain. She reports chronic joint pain since 2005 around age 8. She has had pain around the hips, knees and ankles. Around 2010 more joint pain in shoulders, wrists, hands. Right shoulder has dislocated 2 times around 2010. No swelling in the joints.   Now having more pain in right ankle.   She had to stop working out due to the pain in the shoulder. When reaching and grabbing will cause worsening right shoulder pain.  Also has lower back pain for a few years.   No hx of psoriasis  She was seen by physiatry Dr. Behar and xray right ankle and normal. Referred to PT and will be starting. Also started diclofenac as needed.   No family hx of autoimmune disease,  She reports being hypermobile.  Some mild dizziness   XR right shoulder was done in 2015 showing diastasis of AC joint, she reports having and MRI of the shoulder \"loose tendons.\"        Wt Readings from Last 2 Encounters:   10/15/24 160 lb (72.6 kg)   10/07/24 160 lb (72.6 kg)     Body mass index is 23.63 kg/m².      Current Outpatient Medications   Medication Sig Dispense Refill    diclofenac 75 MG Oral Tab EC Take 1 tablet (75 mg total) by mouth 2 (two) times daily. Take with food for 2 weeks as directed and then as needed. 60 tablet 1    Dexmethylphenidate HCl ER (FOCALIN XR) 10 MG Oral Capsule SR 24 Hr Take 1 cap PO every afternoon at 1-2 PM as needed. 30 capsule 0    Dexmethylphenidate HCl ER (FOCALIN XR) 20 MG Oral Capsule SR 24 Hr Take 1 capsule (20 mg total) by mouth every morning. 30 capsule 0    NUVARING 0.12-0.015 MG/24HR Vaginal Ring Place 1 Ring vaginally every 30 (thirty) days. 3 Ring 2    cyclobenzaprine 5 MG  Oral Tab Take 1 tablet (5 mg total) by mouth nightly. (Patient not taking: Reported on 10/15/2024) 30 tablet 0      Past Medical History:    Back problem    lower back discomfort-sees chiropractor    Patient denies medical problems    UTI (urinary tract infection)    Visual impairment    glaases for driving      Past Surgical History:   Procedure Laterality Date    Prior myomectomy      open myomectomy (16cm fibroid)      Family History   Problem Relation Age of Onset    Diabetes Father     Hypertension Father     Diabetes Paternal Grandfather     Cancer Paternal Grandfather       Social History:  Social History     Socioeconomic History    Marital status: Single   Tobacco Use    Smoking status: Never    Smokeless tobacco: Never   Vaping Use    Vaping status: Never Used   Substance and Sexual Activity    Alcohol use: Yes     Alcohol/week: 3.0 standard drinks of alcohol     Types: 3 Glasses of wine per week     Comment: weekends     Drug use: Never           REVIEW OF SYSTEMS:   Review Of Systems:  Constitutional: No fever, no change in weight or appetitie  Derm: No rashes, no oral ulcers, no alopecia, no photosensitivity, no psoriasis  HEENT: No dry eyes, no dry mouth, no Raynaud's, no nasal ulcers, no parotid swelling, no neck pain, no jaw pain, no temple pain  Eyes: No visual changes,   CVS: No chest pain, no heart disease  RS: No SOB, no Cough, No Pleurtic pain,   GI: No nausea, no vomiiting, no abominal pain, no hx of ulcer, no gastritis, no heartburn, no dyshpagia, no BRBPR or melena  : no dysuria, no hx of miscarriages, no DVT Hx, no hx of OCP,   Neuro: No numbness or tingling, no headache, no hx of seizures,   Psych: no hx of anxiety or depression  ENDO: no hx of thyroid disease, no hx of DM  Joint/Muscluskeltal: see HPI,   All other ROS are negative.     EXAM:   /69 (BP Location: Left arm, Patient Position: Sitting, Cuff Size: adult)   Pulse 87   Ht 5' 9\" (1.753 m)   Wt 160 lb (72.6 kg)   LMP  07/08/2024 (Exact Date)   BMI 23.63 kg/m²   GEN: AAOx3, NAD  HEENT: EOMI, PERRLA, no injection or icterus, oral mucosa moist, no oral lesions. No lymphadenopathy. No facial rash  CVS: RRR, no murmurs rubs or gallops. Equal 2+ distal pulses.   LUNGS: CTAB, no increased work of breathing  ABDOMEN:  soft NT/ND, +BS, no HSM  SKIN: No rashes or skin lesions. No nail findings  MSK:  Passively dorsiflex the fifth MC P joint past 90 degrees  Oppose the thumb to the volar aspect of the ipsilateral forearm  Hyperextend the elbows  Place the hands flat on the floor without bending her knees  NEURO: Cranial nerves II-XII intact grossly. 5/5 strength throughout in both upper and lower extremities, sensation intact.  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  History of subluxation in her shoulder.  Chronic pain in her ankles especially her left ankle  - X-ray of the left ankle was normal  - Plan to workup for any autoimmune cause but suspicion is low  - She will continue diclofenac as needed for her pain  - Referral given for physical therapy for diffuse joint pain and hypermobility  - If right shoulder pain does not improve may have to consider further imaging  - she will also be getting an echocardiogram    Thank you for allowing me to participate in this patients care. Pt will f/u in 6 mos     Leela Baez MD  10/15/2024  2:15 PM

## 2024-10-15 NOTE — PATIENT INSTRUCTIONS
You were seen today for joint pain  You are hypermobile that could be contributing to a lot of your joint pain.  You may even have a component of EDS, Marlene's Eliass  Continue diclofenac as needed  I am getting more workup to rule out anything autoimmune  I gave you referral for physical therapy

## 2024-10-16 LAB — CCP IGG SERPL-ACNC: 1.6 U/ML (ref 0–6.9)

## 2024-10-19 LAB
HLA B27 SCREENING: NEGATIVE
HLA B27 SCREENING: NEGATIVE

## 2024-11-01 ENCOUNTER — LAB ENCOUNTER (OUTPATIENT)
Dept: LAB | Age: 29
End: 2024-11-01
Attending: FAMILY MEDICINE
Payer: COMMERCIAL

## 2024-11-01 DIAGNOSIS — E55.9 VITAMIN D DEFICIENCY: ICD-10-CM

## 2024-11-01 DIAGNOSIS — G89.29 OTHER CHRONIC PAIN: ICD-10-CM

## 2024-11-01 DIAGNOSIS — Z00.00 ROUTINE PHYSICAL EXAMINATION: ICD-10-CM

## 2024-11-01 LAB
ALBUMIN SERPL-MCNC: 4.2 G/DL (ref 3.2–4.8)
ALBUMIN/GLOB SERPL: 1.3 {RATIO} (ref 1–2)
ALP LIVER SERPL-CCNC: 48 U/L
ALT SERPL-CCNC: 21 U/L
ANION GAP SERPL CALC-SCNC: 5 MMOL/L (ref 0–18)
AST SERPL-CCNC: 20 U/L (ref ?–34)
BASOPHILS # BLD AUTO: 0.1 X10(3) UL (ref 0–0.2)
BASOPHILS NFR BLD AUTO: 1.3 %
BILIRUB SERPL-MCNC: 0.4 MG/DL (ref 0.3–1.2)
BUN BLD-MCNC: 10 MG/DL (ref 9–23)
CALCIUM BLD-MCNC: 9.5 MG/DL (ref 8.7–10.4)
CHLORIDE SERPL-SCNC: 105 MMOL/L (ref 98–112)
CHOLEST SERPL-MCNC: 189 MG/DL (ref ?–200)
CO2 SERPL-SCNC: 27 MMOL/L (ref 21–32)
CREAT BLD-MCNC: 0.89 MG/DL
CRP SERPL-MCNC: 0.5 MG/DL (ref ?–0.5)
EGFRCR SERPLBLD CKD-EPI 2021: 89 ML/MIN/1.73M2 (ref 60–?)
EOSINOPHIL # BLD AUTO: 0.42 X10(3) UL (ref 0–0.7)
EOSINOPHIL NFR BLD AUTO: 5.4 %
ERYTHROCYTE [DISTWIDTH] IN BLOOD BY AUTOMATED COUNT: 13.1 %
ERYTHROCYTE [SEDIMENTATION RATE] IN BLOOD: 8 MM/HR
FASTING PATIENT LIPID ANSWER: YES
FASTING STATUS PATIENT QL REPORTED: YES
GLOBULIN PLAS-MCNC: 3.2 G/DL (ref 2–3.5)
GLUCOSE BLD-MCNC: 87 MG/DL (ref 70–99)
HCT VFR BLD AUTO: 39.9 %
HDLC SERPL-MCNC: 66 MG/DL (ref 40–59)
HGB BLD-MCNC: 13.4 G/DL
IMM GRANULOCYTES # BLD AUTO: 0.02 X10(3) UL (ref 0–1)
IMM GRANULOCYTES NFR BLD: 0.3 %
LDLC SERPL CALC-MCNC: 103 MG/DL (ref ?–100)
LYMPHOCYTES # BLD AUTO: 2.5 X10(3) UL (ref 1–4)
LYMPHOCYTES NFR BLD AUTO: 32 %
MCH RBC QN AUTO: 29.3 PG (ref 26–34)
MCHC RBC AUTO-ENTMCNC: 33.6 G/DL (ref 31–37)
MCV RBC AUTO: 87.1 FL
MONOCYTES # BLD AUTO: 0.61 X10(3) UL (ref 0.1–1)
MONOCYTES NFR BLD AUTO: 7.8 %
NEUTROPHILS # BLD AUTO: 4.17 X10 (3) UL (ref 1.5–7.7)
NEUTROPHILS # BLD AUTO: 4.17 X10(3) UL (ref 1.5–7.7)
NEUTROPHILS NFR BLD AUTO: 53.2 %
NONHDLC SERPL-MCNC: 123 MG/DL (ref ?–130)
OSMOLALITY SERPL CALC.SUM OF ELEC: 282 MOSM/KG (ref 275–295)
PLATELET # BLD AUTO: 324 10(3)UL (ref 150–450)
POTASSIUM SERPL-SCNC: 3.8 MMOL/L (ref 3.5–5.1)
PROT SERPL-MCNC: 7.4 G/DL (ref 5.7–8.2)
RBC # BLD AUTO: 4.58 X10(6)UL
RHEUMATOID FACT SERPL-ACNC: 8.2 IU/ML (ref ?–14)
SODIUM SERPL-SCNC: 137 MMOL/L (ref 136–145)
TRIGL SERPL-MCNC: 116 MG/DL (ref 30–149)
TSI SER-ACNC: 2.43 UIU/ML (ref 0.55–4.78)
VIT B12 SERPL-MCNC: 691 PG/ML (ref 211–911)
VIT D+METAB SERPL-MCNC: 31.6 NG/ML (ref 30–100)
VLDLC SERPL CALC-MCNC: 19 MG/DL (ref 0–30)
WBC # BLD AUTO: 7.8 X10(3) UL (ref 4–11)

## 2024-11-01 PROCEDURE — 86140 C-REACTIVE PROTEIN: CPT

## 2024-11-01 PROCEDURE — 85025 COMPLETE CBC W/AUTO DIFF WBC: CPT

## 2024-11-01 PROCEDURE — 85652 RBC SED RATE AUTOMATED: CPT

## 2024-11-01 PROCEDURE — 86431 RHEUMATOID FACTOR QUANT: CPT

## 2024-11-01 PROCEDURE — 86225 DNA ANTIBODY NATIVE: CPT

## 2024-11-01 PROCEDURE — 81374 HLA I TYPING 1 ANTIGEN LR: CPT

## 2024-11-01 PROCEDURE — 82306 VITAMIN D 25 HYDROXY: CPT

## 2024-11-01 PROCEDURE — 36415 COLL VENOUS BLD VENIPUNCTURE: CPT

## 2024-11-01 PROCEDURE — 86038 ANTINUCLEAR ANTIBODIES: CPT

## 2024-11-01 PROCEDURE — 80053 COMPREHEN METABOLIC PANEL: CPT

## 2024-11-01 PROCEDURE — 84443 ASSAY THYROID STIM HORMONE: CPT

## 2024-11-01 PROCEDURE — 80061 LIPID PANEL: CPT

## 2024-11-01 PROCEDURE — 82607 VITAMIN B-12: CPT

## 2024-11-05 LAB
DSDNA IGG SERPL IA-ACNC: 2 IU/ML
ENA AB SER QL IA: 0.1 UG/L
ENA AB SER QL IA: NEGATIVE

## 2024-11-06 LAB
HLA B27 SCREENING: NEGATIVE
HLA B27 SCREENING: NEGATIVE

## 2024-11-07 ENCOUNTER — OFFICE VISIT (OUTPATIENT)
Dept: FAMILY MEDICINE CLINIC | Facility: CLINIC | Age: 29
End: 2024-11-07
Payer: COMMERCIAL

## 2024-11-07 VITALS
BODY MASS INDEX: 23.05 KG/M2 | HEIGHT: 70 IN | OXYGEN SATURATION: 97 % | SYSTOLIC BLOOD PRESSURE: 123 MMHG | DIASTOLIC BLOOD PRESSURE: 80 MMHG | WEIGHT: 161 LBS | HEART RATE: 93 BPM | TEMPERATURE: 99 F | RESPIRATION RATE: 18 BRPM

## 2024-11-07 DIAGNOSIS — J02.9 SORE THROAT: ICD-10-CM

## 2024-11-07 DIAGNOSIS — R68.89 FLU-LIKE SYMPTOMS: ICD-10-CM

## 2024-11-07 DIAGNOSIS — J03.90 EXUDATIVE TONSILLITIS: Primary | ICD-10-CM

## 2024-11-07 LAB
CONTROL LINE PRESENT WITH A CLEAR BACKGROUND (YES/NO): YES YES/NO
KIT LOT #: NORMAL NUMERIC

## 2024-11-07 PROCEDURE — 87081 CULTURE SCREEN ONLY: CPT

## 2024-11-07 PROCEDURE — 3008F BODY MASS INDEX DOCD: CPT

## 2024-11-07 PROCEDURE — 3074F SYST BP LT 130 MM HG: CPT

## 2024-11-07 PROCEDURE — 3079F DIAST BP 80-89 MM HG: CPT

## 2024-11-07 PROCEDURE — 87880 STREP A ASSAY W/OPTIC: CPT

## 2024-11-07 PROCEDURE — 99213 OFFICE O/P EST LOW 20 MIN: CPT

## 2024-11-07 NOTE — PROGRESS NOTES
Subjective:   Patient ID: Tiffany Maxwell is a 29 year old female.    Patient presents to clinic with complaints of sore throat, loss of appetite, chills and body aches since 11/05. Denies any known exposures or other members in house sick with similar symptoms. Reports recently received COVID vaccine and home COVID test was negative. Has not received influenza vaccine this year. Has been taking tylenol cold and flu.    Flu  This is a new problem. The current episode started in the past 7 days. The problem has been unchanged. Associated symptoms include fatigue and a sore throat. Pertinent negatives include no congestion or coughing.       History/Other:   Review of Systems   Constitutional:  Positive for fatigue.   HENT:  Positive for sore throat. Negative for congestion.    Respiratory:  Negative for cough.    All other systems reviewed and are negative.    Current Outpatient Medications   Medication Sig Dispense Refill    diclofenac 75 MG Oral Tab EC Take 1 tablet (75 mg total) by mouth 2 (two) times daily. Take with food for 2 weeks as directed and then as needed. 60 tablet 1    Dexmethylphenidate HCl ER (FOCALIN XR) 10 MG Oral Capsule SR 24 Hr Take 1 cap PO every afternoon at 1-2 PM as needed. 30 capsule 0    Dexmethylphenidate HCl ER (FOCALIN XR) 20 MG Oral Capsule SR 24 Hr Take 1 capsule (20 mg total) by mouth every morning. 30 capsule 0    cyclobenzaprine 5 MG Oral Tab Take 1 tablet (5 mg total) by mouth nightly. (Patient not taking: Reported on 10/15/2024) 30 tablet 0    NUVARING 0.12-0.015 MG/24HR Vaginal Ring Place 1 Ring vaginally every 30 (thirty) days. 3 Ring 2     Allergies:Allergies[1]    Objective:   Physical Exam  Vitals reviewed.   Constitutional:       General: She is not in acute distress.     Appearance: Normal appearance. She is not ill-appearing or toxic-appearing.   HENT:      Head: Normocephalic and atraumatic.      Right Ear: Tympanic membrane, ear canal and external ear normal.  No middle ear effusion. Tympanic membrane is not erythematous, retracted or bulging.      Left Ear: Tympanic membrane, ear canal and external ear normal.  No middle ear effusion. Tympanic membrane is not erythematous, retracted or bulging.      Nose: Nose normal.      Mouth/Throat:      Mouth: Mucous membranes are moist.      Pharynx: Uvula midline. Posterior oropharyngeal erythema present. No uvula swelling.      Tonsils: Tonsillar exudate present. No tonsillar abscesses. 3+ on the right. 3+ on the left.   Cardiovascular:      Rate and Rhythm: Normal rate and regular rhythm.      Pulses: Normal pulses.      Heart sounds: Normal heart sounds.   Pulmonary:      Effort: Pulmonary effort is normal. No respiratory distress.      Breath sounds: Normal breath sounds. No wheezing, rhonchi or rales.   Musculoskeletal:         General: Normal range of motion.      Cervical back: Normal range of motion and neck supple.   Lymphadenopathy:      Cervical: Cervical adenopathy present.      Right cervical: Superficial cervical adenopathy present.      Left cervical: Superficial cervical adenopathy present.   Skin:     General: Skin is warm and dry.      Capillary Refill: Capillary refill takes less than 2 seconds.   Neurological:      General: No focal deficit present.      Mental Status: She is alert and oriented to person, place, and time.   Psychiatric:         Mood and Affect: Mood normal.         Behavior: Behavior normal.         Assessment & Plan:   1. Exudative tonsillitis    2. Sore throat    3. Flu-like symptoms        Orders Placed This Encounter   Procedures    Strep A Assay W/Optic    Grp A Strep Cult, Throat     Results for orders placed or performed in visit on 11/07/24   Strep A Assay W/Optic    Collection Time: 11/07/24  5:42 PM   Result Value Ref Range    Strep Grp A Screen neg Negative    Control Line Present with a clear background (yes/no) yes Yes/No    Kit Lot # [id number] Numeric    Kit Expiration Date 5/21/25  Date     Reassurance given. Discussion about supportive treatment including over the counter medications, hydration, rest and salt water gargles. Declined offer for viral testing. Will send throat culture.    Meds This Visit:  Requested Prescriptions      No prescriptions requested or ordered in this encounter       Imaging & Referrals:  None         [1] No Known Allergies

## 2024-11-08 ENCOUNTER — TELEPHONE (OUTPATIENT)
Dept: PHYSICAL THERAPY | Facility: HOSPITAL | Age: 29
End: 2024-11-08

## 2024-11-11 ENCOUNTER — APPOINTMENT (OUTPATIENT)
Dept: PHYSICAL THERAPY | Facility: HOSPITAL | Age: 29
End: 2024-11-11
Attending: INTERNAL MEDICINE
Payer: COMMERCIAL

## 2024-11-18 ENCOUNTER — OFFICE VISIT (OUTPATIENT)
Dept: PHYSICAL THERAPY | Facility: HOSPITAL | Age: 29
End: 2024-11-18
Attending: INTERNAL MEDICINE
Payer: COMMERCIAL

## 2024-11-18 PROCEDURE — 97110 THERAPEUTIC EXERCISES: CPT

## 2024-11-18 PROCEDURE — 97163 PT EVAL HIGH COMPLEX 45 MIN: CPT

## 2024-11-19 NOTE — PROGRESS NOTES
SPINE EVALUATION:     Diagnosis:   Hypermobility arthralgia; L patellofemoral pain, R ankle instability/pain      Referring Provider: Leela Baez  Date of Evaluation:    11/18/2024    Precautions:  None Next MD visit:   none scheduled  Date of Surgery: n/a     PATIENT SUMMARY   Tiffany Maxwell is a 29 year old female who presents to therapy today with complaints of multiple joint pain due to hypermobility. Pt. States since highschool having issues with multiple joint pain while playing sports. Pt. States 5-6 years ago she started weight lifting and then had to stop due to increased pain in her back, shoulders, knees, and ankles. Pt. States max walking for 1 hour then has pain throughout joints. States standing still max 15-30 minutes then in pain. 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 States h/o dislocating her R shoulder. Pt. States pain keeps her awake at night. Reports pain radiating down L LE to ankle and occasional numbness down  L LE that comes and goes. Referred to PT to work on stabilization exercises.   Pt describes pain level current 4/10, at best 4/10, at worst 7/10.   Current functional limitations include limited standing and walking tolerance, pain increases throughout joints with activity, unable to weight lift without pain, unable to run due to pain and instability, unable to sleep without pain.     Tiffany describes prior level of function h/o multiple joint pain since highschool with h/o shoulder dislocations, was doing heavy weight lifting 5-6 years ago but had to stop due to pain. Pt goals include improve stability throughout joints, decrease pain, be able to stand/walk without increased pain.  Past medical history was reviewed with Tiffany. Significant findings include  has a past medical history of Back problem, Patient denies medical problems, UTI (urinary tract infection) (2014), and Visual impairment.    Pt denies diplopia, dysarthria, dysphasia, dizziness, drop  attacks, bowel/bladder changes, saddle anesthesia, and ADDIE LE N/T.    ASSESSMENT  Tiffany presents to physical therapy evaluation with primary c/o multiple joint pain (back, shoulders, knees, ankles) due to hypermobility arthralgia. The results of the objective tests and measures show (+)hypermobility testing throughout all joints, beighton score: 10/10;instability in B shoulders and ankles, and scapular and core weakness, B UE And LE weakness.  Functional deficits include but are not limited to limited standing/walk due to pain, pain with activity, pain with lifting/working out, pain with sleeping.  Signs and symptoms are consistent with diagnosis of hypermobility arthralgia. Pt and PT discussed evaluation findings, pathology, POC and HEP.  Pt voiced understanding and performs HEP correctly without reported pain. Skilled Physical Therapy is medically necessary to address the above impairments and reach functional goals.     OBJECTIVE:   Observation/Posture: double jointed, stands with knees hyperextended  Neuro Screen: reports \"sciatic pain\" down L LE to ankle; sensation intact to light touch    lumbar AROM: (* denotes performed with pain)  Flexion: palms to the floor  Extension: > 10 cm  Sidebending: R 10cm; L 10cm  Rotation: R 40cm; L 40cm    Accessory motion: hypermobility noted at all joints  Palpation: pt. Reports hypersensitivity to all palpation. Most along spine, R shoulder, L knee, and R ankle; laxity noted throughout    Strength: (* denotes performed with pain)  UE/Scapular LE   Shoulder Flex: R 4-/5, L 4-/5  Shoulder ABD (C5): R 4-/5, L 4-/5  Biceps (C6): R 4/5, L 4/5  Wrist ext (C6): R 4-/5, L 4-/5  Triceps (C7): R 4-/5, L 4-/5  Wrist Flex (C7): R 4-/5, L 4-/5  Digit Flex (C8): R 4-/5, L 4-/5  Thumb Ext (C8): R 4/5, L 4/5  Interossei (T1): R 4/5, L 4/5    Rhomboids: R 3+/5, L 3+/5  Mid trap: R 3+/5; L 3+/5  Lats: R 4-/5, L 4-/5  Low trap: R 3-/5; L 3-/5   Hip flexion (L2): R 4-/5; L 4-/5  Hip  abduction: R 4/5; L 4/5  Hip Extension: R 4-/5; L 4-/5   Hip ER: R 4/5; L 4/5  Hip IR: R 4/5; L 4/5  Knee Flexion: R 4/5; L 4/5   Knee extension (L3): R 4/5; L 4/5   DF (L4): R 4-/5; L 4-/5  Great Toe Ext (L5): R 4/5, L 4/5  PF (S1): R 4-/5; L 4-/5  EV R 4-/5, L 4-/5  IV R 4/5, L 4/5     Flexibility:   UE/Scapular LE   Upper Trap: R mod; L mod  Levator Scap: R mod; L mod  Pec Major: R mod; L mod  Scalenes: R mod, L mod Hip Flexor: RWNL, L WNL  Hamstrings: R mild; L mild  Piriformis: R mod; L mod  Quads: R WNL; L WNL  Gastroc-soleus: R mild; L mild     Special tests:   beighton score: 10/10 (+) hypermobility  Laxity R shoulder with pain, Laxity R ankle with pain  Repeated lumbar flexion: increased L radicular leg pain, worse; repeated ext: pain centralized.    Gait: pt ambulates on level ground with antalgia, decreased step length L, and decreased stance phase L .TM 2.5mph; step length R 74cm, L 73cm; % of time on each leg: R 53%, L 47%  Balance: SLS R 30, L 30  TU.3 seconds  5 times sit-stand: 12.83    Today’s Treatment and Response:   Pt education was provided on exam findings, treatment diagnosis, treatment plan, expectations, and prognosis. Pt was also provided recommendations for activity modifications, possible soreness after evaluation, modalities as needed [ice/heat], postural corrections, ergonomics, pain science education , detrimental fear avoidance behaviors, and importance of remaining active  Patient was instructed in and issued a HEP for: SKTC, DKTC, LTR, HS c flossing    Charges: PT Eval High Complexity, TE      Total Timed Treatment: 10 min     Total Treatment Time: 45 min     Based on clinical rationale and outcome measures, this evaluation involved High Complexity decision making due to 3+ personal factors/comorbidities, 4+ body structures involved/activity limitations, and evolving symptoms including changing pain levels.  PLAN OF CARE:    Goals: (to be met in 10 visits)     1. Improve core  and scapular stability by 1/2 grade to promote improved posture and body mechanics with bending and lifting.    2. Improve 5 times sit-stand to 10 seconds or less to improve overall fall risk and safety.     3. Decrease symptoms by 50% to allow patient to sleep 6 hours without interruption from pain.    4. Improve B LE strength by 1/2 grade to allow patient to negotiate stairs with more ease.    5. Patient to demonstrate improved posture and body mechanics with bending/lifting up to 10 pounds.    6. Patient to be independent with HEP, joint protection principles, improved posture and body mechanics.      Frequency / Duration: Patient will be seen for 1-2 x/week or a total of 10 visits over a 90 day period. Treatment will include: Gait training, Manual Therapy, Neuromuscular Re-education, Therapeutic Activities, Therapeutic Exercise, Home Exercise Program instruction, and Modalities to include: ice, taping    Education or treatment limitation: None  Rehab Potential:good    LEFS Score  LEFS Score: (Patient-Rptd) 81.25 % (11/18/2024  2:46 PM)    Patient/Family/Caregiver was advised of these findings, precautions, and treatment options and has agreed to actively participate in planning and for this course of care.    Thank you for your referral. Please co-sign or sign and return this letter via fax as soon as possible to [phone]. If you have any questions, please contact me at Dept: [phone]    Sincerely,  Electronically signed by therapist: Vidhi Meraz PT    Physician's certification required: Yes  I certify the need for these services furnished under this plan of treatment and while under my care.    X___________________________________________________ Date____________________    Certification From: 11/18/2024  To:2/16/2025

## 2024-11-25 ENCOUNTER — OFFICE VISIT (OUTPATIENT)
Dept: PHYSICAL THERAPY | Facility: HOSPITAL | Age: 29
End: 2024-11-25
Attending: INTERNAL MEDICINE
Payer: COMMERCIAL

## 2024-11-25 PROCEDURE — 97112 NEUROMUSCULAR REEDUCATION: CPT

## 2024-11-25 PROCEDURE — 97110 THERAPEUTIC EXERCISES: CPT

## 2024-11-25 NOTE — PROGRESS NOTES
Diagnosis:   Hypermobility arthralgia; L patellofemoral pain, R ankle instability/pain       Referring Provider: Leela Baez  Date of Evaluation:   11/18/2024    Precautions:  None Next MD visit:   none scheduled  Date of Surgery: n/a   Insurance Primary/Secondary: [place] / N/A       # Auth Visits: 5   Total Timed Treatment: 45 min  Date POC Expires: 12/31/24   Total Treatment time: 45 min       Charges: TE (15) NREED 2(30)       Treatment Number: 2/5    Subjective: Pt. Reports 2-3/10 pain today, most noticeably in her ankle, hip, back and shoulder.     Pain: 2-3/10     Objective/Goals: Refer to flow sheet. Emphasis on core and scapular stabilization. Performed abdominal isometrics and quadruped and seated on SB exercises to engage stability throughout body.    Treatment 2/5    TE  Scifit x 5 min L4 hills  Bridge c SB x 10  DKTC c SB x 10  LTR c SB x 10  ITB stretch 10 sec x 10  HS stretch 10 sec x 10  Wall wipe c SB x 10  Passive LE stretch by PT    NREED  Abdominal iso c SB all 4s x 10  Abdominal iso c SB diagonal x 10  Seated on SB   *pelvic circles cw/ccw x 10  *march x 10  *knee ext x 10  *opp arm/leg x 10  *seated row c green band x 10  *seated ext c green band x 10  *sit-stand x 5  Wall push up with SB x 10  4 point opp arm/leg x 10      Goals: (to be met in 10 visits)     1. Improve core and scapular stability by 1/2 grade to promote improved posture and body mechanics with bending and lifting.    2. Improve 5 times sit-stand to 10 seconds or less to improve overall fall risk and safety.     3. Decrease symptoms by 50% to allow patient to sleep 6 hours without interruption from pain.    4. Improve B LE strength by 1/2 grade to allow patient to negotiate stairs with more ease.    5. Patient to demonstrate improved posture and body mechanics with bending/lifting up to 10 pounds.    6. Patient to be independent with HEP, joint protection principles, improved posture and body mechanics.  HEP: SKTC, DKTC,  LTR, HS stretch, ITB stretch, bold exercises listed above  Education: joint protection principles, body mechanics, pain management, importance of remaining active    Assessment: Tolerated session well. CGA to SBA for seated on SB exercises. Good response to CKC and stabilization exercises.       Plan: Assess response to last session. Continue with spinal and scapular stabilization. Add sand dune next session.

## 2024-12-02 ENCOUNTER — OFFICE VISIT (OUTPATIENT)
Dept: PHYSICAL THERAPY | Facility: HOSPITAL | Age: 29
End: 2024-12-02
Attending: INTERNAL MEDICINE
Payer: COMMERCIAL

## 2024-12-02 PROCEDURE — 97110 THERAPEUTIC EXERCISES: CPT

## 2024-12-02 PROCEDURE — 97112 NEUROMUSCULAR REEDUCATION: CPT

## 2024-12-02 NOTE — PROGRESS NOTES
Diagnosis:   Hypermobility arthralgia; L patellofemoral pain, R ankle instability/pain       Referring Provider: Leela Baez  Date of Evaluation:   11/18/2024    Precautions:  None Next MD visit:   none scheduled  Date of Surgery: n/a   Insurance Primary/Secondary: [employer] / N/A       # Auth Visits: 5   Total Timed Treatment: 45 min  Date POC Expires: 12/31/24   Total Treatment time: 45 min       Charges: TE (15) NREED 2(30)       Treatment Number: 3/5    Subjective: Pt. Reports 3/10 ankle pain today. Everything else is feeling ok.     Pain: 3/10     Objective/Goals: Refer to flow sheet. Emphasis on core and scapular stabilization. Emphasis on stabilization standing exercises on sand dune this date.     Treatment 3/5    TE  Scifit x 5 min L4 hills  A/P board x 10  M/L board x 10  Quad stretch on 8 inch step x 10  Bridge c SB x 10  DKTC c SB x 10  LTR c SB x 10  ITB stretch 10 sec x 10  HS stretch 10 sec x 10  Wall wipe c SB x 10  Wall push up c SB x 10  Passive LE stretch by PT    NREED  Abdominal iso c SB all 4s x 10  Abdominal iso c SB diagonal x 10  Sand dune c ADIM  *step ups x 10  *fwd lunge x 10  *toe/heel raises x 10  *mini squats c ball squeeze x 10    Goals: (to be met in 10 visits)     1. Improve core and scapular stability by 1/2 grade to promote improved posture and body mechanics with bending and lifting.    2. Improve 5 times sit-stand to 10 seconds or less to improve overall fall risk and safety.     3. Decrease symptoms by 50% to allow patient to sleep 6 hours without interruption from pain.    4. Improve B LE strength by 1/2 grade to allow patient to negotiate stairs with more ease.    5. Patient to demonstrate improved posture and body mechanics with bending/lifting up to 10 pounds.    6. Patient to be independent with HEP, joint protection principles, improved posture and body mechanics.  HEP: SKTC, DKTC, LTR, HS stretch, ITB stretch, bold exercises listed above  Education: joint protection  principles, body mechanics, pain management, importance of remaining active    Assessment: Tolerated session well. Good response to CKC and stabilization exercises. No increase pain during session.       Plan: Assess response to last session. Continue with spinal and scapular stabilization. Progress with shuttle next session.

## 2024-12-09 ENCOUNTER — OFFICE VISIT (OUTPATIENT)
Dept: PHYSICAL THERAPY | Facility: HOSPITAL | Age: 29
End: 2024-12-09
Attending: INTERNAL MEDICINE
Payer: COMMERCIAL

## 2024-12-09 PROCEDURE — 97112 NEUROMUSCULAR REEDUCATION: CPT

## 2024-12-09 PROCEDURE — 97110 THERAPEUTIC EXERCISES: CPT

## 2024-12-09 NOTE — PROGRESS NOTES
Diagnosis:   Hypermobility arthralgia; L patellofemoral pain, R ankle instability/pain       Referring Provider: Leela Baez  Date of Evaluation:   11/18/2024    Precautions:  None Next MD visit:   none scheduled  Date of Surgery: n/a   Insurance Primary/Secondary: [place] / N/A       # Auth Visits: 5   Total Timed Treatment: 45 min  Date POC Expires: 12/31/24   Total Treatment time: 45 min       Charges: TE (15) NREED 2(30)       Treatment Number: 4/5    Subjective: Pt. Reports 3/10 ankle, wrist, back pain today. States wrist was giving her more pain after doing the bird-dog exercise.     Pain: 3/10     Objective/Goals: Refer to flow sheet. Emphasis on core and scapular stabilization. Emphasis on stabilization standing exercises on sand dune this date.     Treatment 4/5    TE  Scifit x 5 min L4 hills  A/P board x 10  M/L board x 10  Quad stretch on 8 inch step x 10  Bridge c SB x 15  DKTC c SB x 15  LTR c SB x 15  ITB stretch 10 sec x 10  HS stretch 10 sec x 10  Wall wipe c SB x 15  Passive LE stretch by PT    NREED  Supine on foam beam  *Abdominal iso c SB all 4s x 10  *Abdominal iso c SB diagonal x 10  *hip add c ball and bridge x 10  *bench press x 10 c 3#  *flys x 10 c 3#  *OH flex x 10 c 3#  *opp arm/leg x 10  Sand dune c ADIM  *step ups x 10  *fwd lunge x 10  *toe/heel raises x 10  *mini squats c ball squeeze x 10    Goals: (to be met in 10 visits)     1. Improve core and scapular stability by 1/2 grade to promote improved posture and body mechanics with bending and lifting.    2. Improve 5 times sit-stand to 10 seconds or less to improve overall fall risk and safety.     3. Decrease symptoms by 50% to allow patient to sleep 6 hours without interruption from pain.    4. Improve B LE strength by 1/2 grade to allow patient to negotiate stairs with more ease.    5. Patient to demonstrate improved posture and body mechanics with bending/lifting up to 10 pounds.    6. Patient to be independent with HEP, joint  protection principles, improved posture and body mechanics.  HEP: SKTC, DKTC, LTR, HS stretch, ITB stretch, seated SB exercises, 4 point on SB exercises, bird-dog, squats, lunges, step ups, bold exercises listed above  Education: joint protection principles, body mechanics, pain management, importance of remaining active    Assessment: Tolerated session well. Good response to CKC and stabilization exercises. No increase pain during session.       Plan: Reassess next visit. Review/update HEP for discharge.

## 2024-12-13 ENCOUNTER — PATIENT MESSAGE (OUTPATIENT)
Dept: FAMILY MEDICINE CLINIC | Facility: CLINIC | Age: 29
End: 2024-12-13

## 2024-12-14 DIAGNOSIS — Z76.0 MEDICATION REFILL: ICD-10-CM

## 2024-12-14 RX ORDER — ETONOGESTREL/ETHINYL ESTRADIOL .12-.015MG
1 RING, VAGINAL VAGINAL
Qty: 1 RING | Refills: 0 | Status: SHIPPED | OUTPATIENT
Start: 2024-12-14

## 2024-12-14 NOTE — TELEPHONE ENCOUNTER
Last annual was 11/29/23 with GORDO  Next annual is 1/6/25  Nuvaring refilled x 1 month to cover pt until annual.

## 2024-12-16 ENCOUNTER — TELEPHONE (OUTPATIENT)
Dept: PHYSICAL THERAPY | Facility: HOSPITAL | Age: 29
End: 2024-12-16

## 2024-12-16 ENCOUNTER — APPOINTMENT (OUTPATIENT)
Dept: PHYSICAL THERAPY | Facility: HOSPITAL | Age: 29
End: 2024-12-16
Attending: INTERNAL MEDICINE
Payer: COMMERCIAL

## 2024-12-16 RX ORDER — DICLOFENAC SODIUM 75 MG/1
TABLET, DELAYED RELEASE ORAL
Qty: 60 TABLET | Refills: 0 | Status: SHIPPED | OUTPATIENT
Start: 2024-12-16

## 2024-12-16 NOTE — TELEPHONE ENCOUNTER
Called patient verified full name and . Informed patient the physical screening form was completed. Patient is requesting it be faxed and will provide a fax number. Patient verbalized understanding and did not have any further questions.

## 2024-12-16 NOTE — TELEPHONE ENCOUNTER
Refill Request    Medication request: diclofenac 75 MG Oral Tab EC Take 1 tablet (75 mg total) by mouth 2 (two) times daily. Take with food for 2 weeks as directed and then as needed.     LOV:10/7/2024 Behar, Alex, MD   Due back to clinic per last office note:  \"She will follow-up with me in 6 to 8 weeks after beginning therapy. I would like to see her as well separately for the upper half concerns.\"  NOV: Visit date not found      [place]: 11[place]6/24 #60 - 30 day supply    Urine drug screen (if applicable): n/a  Pain contract: none    LOV plan (if weaning or changing medications): Per Dr.Behar at last office visit: \"She should start diclofenac twice per day and use Tylenol as well. \"    Per Dr.Behar protocol. This will be the last refill and patient will need to schedule a follow up appointment.     Message sent to patient via [employer].

## 2024-12-16 NOTE — TELEPHONE ENCOUNTER
Patient has read [facility] message:    From  Radha Bae CMA To  Tiffany Molina Sent and Delivered  12/16/2024 11:14 AM   Last Read in [employer]  12/16/2024 11:15 AM by Tiffany Molina

## 2025-01-06 ENCOUNTER — OFFICE VISIT (OUTPATIENT)
Dept: OBGYN CLINIC | Facility: CLINIC | Age: 30
End: 2025-01-06
Payer: COMMERCIAL

## 2025-01-06 VITALS
DIASTOLIC BLOOD PRESSURE: 78 MMHG | BODY MASS INDEX: 23 KG/M2 | SYSTOLIC BLOOD PRESSURE: 127 MMHG | HEART RATE: 88 BPM | WEIGHT: 163.38 LBS

## 2025-01-06 DIAGNOSIS — Z76.0 MEDICATION REFILL: ICD-10-CM

## 2025-01-06 DIAGNOSIS — Z12.4 SCREENING FOR CERVICAL CANCER: ICD-10-CM

## 2025-01-06 DIAGNOSIS — Z12.4 CERVICAL CANCER SCREENING: ICD-10-CM

## 2025-01-06 DIAGNOSIS — Z01.419 WELL WOMAN EXAM: Primary | ICD-10-CM

## 2025-01-06 PROCEDURE — 87624 HPV HI-RISK TYP POOLED RSLT: CPT | Performed by: OBSTETRICS & GYNECOLOGY

## 2025-01-06 RX ORDER — ETONOGESTREL/ETHINYL ESTRADIOL .12-.015MG
1 RING, VAGINAL VAGINAL
Qty: 3 RING | Refills: 3 | Status: SHIPPED | OUTPATIENT
Start: 2025-01-06

## 2025-01-06 NOTE — PROGRESS NOTES
Chief Complaint   Patient presents with    Gyn Exam     Annual // Nuva-ring refill //Reviewed Preventative/Wellness form with patient.           HPI:  The patient is a 28 yo F here for WWE.  MOnthly cycles with nuvaring.  Monogamous.  NO gyn c/o today    Patient's last menstrual period was 2024 (approximate).        Latest Ref Rng & Units 10/27/2022     4:33 PM 10/31/2019    10:47 AM   RECENT PAP RESULTS   INTERPRETATION/RESULT: Negative for intraepithelial lesion or malignancy Negative for intraepithelial lesion or malignancy  Negative for intraepithelial lesion or malignancy    HPV Negative  Negative         Pap Date: 10/27/22  Pap Result Notes: Neg Pap  Follow Up Recommendation: Annual 23 GORDO      Chaperone: declines      Depression Screening (PHQ-2/PHQ-9): Over the LAST 2 WEEKS   Little interest or pleasure in doing things: Not at all    Feeling down, depressed, or hopeless: Several days    PHQ-2 SCORE: 1          Reviewed medical and surgical history below       OBSTETRICS HISTORY:  OB History    Para Term  AB Living   0 0 0 0 0 0   SAB IAB Ectopic Multiple Live Births   0 0 0 0 0       GYNE HISTORY:  History   Sexual Activity    Sexual activity: Not on file            MEDICAL HISTORY:  Past Medical History:    Back problem    lower back discomfort-sees chiropractor    Patient denies medical problems    UTI (urinary tract infection)    Visual impairment    glaases for driving       SURGICAL HISTORY:  Past Surgical History:   Procedure Laterality Date    Prior myomectomy      open myomectomy (16cm fibroid)       SOCIAL HISTORY:  Social History     Socioeconomic History    Marital status: Single     Spouse name: Not on file    Number of children: Not on file    Years of education: Not on file    Highest education level: Not on file   Occupational History    Not on file   Tobacco Use    Smoking status: Never    Smokeless tobacco: Never   Vaping Use    Vaping status: Never Used   Substance  and Sexual Activity    Alcohol use: Yes     Alcohol/week: 3.0 standard drinks of alcohol     Types: 3 Glasses of wine per week     Comment: weekends     Drug use: Never    Sexual activity: Not on file   Other Topics Concern    Not on file   Social History Narrative    Not on file     Social Drivers of Health     Financial Resource Strain: Not on file   Food Insecurity: Not on file   Transportation Needs: Not on file   Physical Activity: Not on file   Stress: Not on file   Social Connections: Not on file   Housing Stability: Not on file       FAMILY HISTORY:  Family History   Problem Relation Age of Onset    Diabetes Father     Hypertension Father     Other (chornic kidney disease) Father     Diabetes Paternal Grandfather     Cancer Paternal Grandfather        MEDICATIONS:    Current Outpatient Medications:     NUVARING 0.12-0.015 MG/24HR Vaginal Ring, Place 1 Ring vaginally every 30 (thirty) days., Disp: 3 Ring, Rfl: 3    diclofenac 75 MG Oral Tab EC, TAKE 1 TABLET BY MOUTH 2 TIMES DAILY WITH FOOD FOR 2 WEEKS AS DIRECTED, THEN USE AS NEEDED., Disp: 60 tablet, Rfl: 0    Dexmethylphenidate HCl ER (FOCALIN XR) 10 MG Oral Capsule SR 24 Hr, Take 1 cap PO every afternoon at 1-2 PM as needed., Disp: 30 capsule, Rfl: 0    Dexmethylphenidate HCl ER (FOCALIN XR) 20 MG Oral Capsule SR 24 Hr, Take 1 capsule (20 mg total) by mouth every morning., Disp: 30 capsule, Rfl: 0    cyclobenzaprine 5 MG Oral Tab, Take 1 tablet (5 mg total) by mouth nightly. (Patient not taking: Reported on 1/6/2025), Disp: 30 tablet, Rfl: 0    ALLERGIES:  Allergies[1]      Review of Systems:  Constitutional:  Denies fevers and chills   Cardiovascular:  denies chest pain or palpitations  Respiratory:  denies shortness of breath  Gastrointestinal:  denies heartburn, abdominal pain, diarrhea or constipation  Genitourinary:  denies dysuria, incontinence, abnormal vaginal discharge, vaginal itching  Musculoskeletal:  denies back pain.  Skin/Breast:  Denies  any breast pain, lumps, or discharge.   Neurological:  denies headaches, extremity weakness  Psychiatric: denies depression or anxiety.      Vitals:    01/06/25 0826   BP: 127/78   Pulse: 88       PHYSICAL EXAM:   Constitutional: well developed, well nourished  Head/Face: normocephalic  Neck/Thyroid: thyroid symmetric, no thyromegaly, no nodules, no adenopathy  Heart: Regular rate and rhythm   Lungs: clear to ascultation bilaterally   Lymphatic:no abnormal supraclavicular or axillary adenopathy is noted  Breast: normal without palpable masses, tenderness, asymmetry, nipple discharge, nipple retraction or skin changes  Abdomen:  soft, nontender, nondistended, no masses  Skin/Hair: no unusual rashes or bruises  Extremities: no edema, no cyanosis  Psychiatric: Appropriate mood and affect    Pelvic Exam:  External Genitalia: normal appearance, hair distribution, and no lesions  Urethral Meatus:  normal in size, location, without lesions and prolapse  Bladder:  No fullness, masses or tenderness  Vagina:  Normal appearance without lesions, no abnormal discharge  Cervix:  Normal without tenderness on motion  Uterus: normal in size, contour, position, mobility, without tenderness  Adnexa: normal without masses or tenderness  Perineum: normal    Assessment/Plan:  Tiffany was seen today for gyn exam.    Diagnoses and all orders for this visit:    Well woman exam    Cervical cancer screening    Medication refill  -     NUVARING 0.12-0.015 MG/24HR Vaginal Ring; Place 1 Ring vaginally every 30 (thirty) days.        WWE:   Reviewed ASCCP guidelines with the patient -- Pap today  Contraception: Nuvaring refilled  Breast Health:     Mammo @ 38 yo  Encouraged monthly self breast exams with the patient   Discussed diet, exercise, MVIs with Vit D  Follow up in 1 yr for WWE             [1] No Known Allergies

## 2025-01-07 LAB — HPV E6+E7 MRNA CVX QL NAA+PROBE: POSITIVE

## 2025-01-09 LAB
HPV16 DNA CVX QL PROBE+SIG AMP: NEGATIVE
HPV18 DNA CVX QL PROBE+SIG AMP: NEGATIVE

## 2025-01-13 RX ORDER — DICLOFENAC SODIUM 75 MG/1
TABLET, DELAYED RELEASE ORAL
Qty: 60 TABLET | Refills: 0 | Status: SHIPPED | OUTPATIENT
Start: 2025-01-13

## 2025-01-13 NOTE — TELEPHONE ENCOUNTER
Refill Request    Medication request: diclofenac 75 MG Oral Tab EC  TAKE 1 TABLET BY MOUTH 2 TIMES DAILY WITH FOOD FOR 2 WEEKS AS DIRECTED, THEN USE AS NEEDED.     LOV:10/7/2024 Behar, Alex, MD   Due back to clinic per last office note:    RTC in 2 months     NOV: Visit date not found      [place]: 12/16/24 #60 (30 days)    Urine drug screen (if applicable): N/A  Pain contract: N/A    LOV plan (if weaning or changing medications): She should start diclofenac twice per day and use Tylenol as well.

## 2025-01-29 ENCOUNTER — OFFICE VISIT (OUTPATIENT)
Dept: PHYSICAL THERAPY | Facility: HOSPITAL | Age: 30
End: 2025-01-29
Attending: INTERNAL MEDICINE
Payer: COMMERCIAL

## 2025-01-29 PROCEDURE — 97110 THERAPEUTIC EXERCISES: CPT

## 2025-01-30 NOTE — PROGRESS NOTES
Dear Dr. Mary Grace Mccormick MD,    This letter is to inform you of Tiffany Gamingureen KATERYNA Brodymaria doloreselvi's DISCHARGE SUMMARY in Physical Therapy at [facility].    DX: Hypermobility arthralgia; L patellofemoral pain, R ankle instability/pain             TREATMENT #   6    Of   6      DATE OF SERVICE: 1/29/2025    ASSESSMENT  Patient reports overall 50% improvement. States 5/10 pain. Patient reports she had held her last PT visit since she had sprained her R ankle while in [place] over christmas break. Pt. States she was on crutches and in a boot and now has returned to being able to walk in her gym shoes with minimal discomfort. Pt. States she would like to continue on her own at this time with her exercises. She is independent with current HEP and will continue on her own at this time.    OTHER  Pain: 5/10  Observation/Posture: double jointed, stands with knees hyperextended  Neuro Screen: reports \"sciatic pain\" down L LE to ankle; sensation intact to light touch    lumbar AROM: (* denotes performed with pain)  Flexion: palms to the floor  Extension: > 10 cm  Sidebending: R 10cm; L 10cm  Rotation: R 40cm; L 40cm    Accessory motion: hypermobility noted at all joints  Palpation: pt. Reports hypersensitivity to all palpation. Most along spine, R shoulder, L knee, and R ankle; laxity noted throughout    Strength: (* denotes performed with pain)  Core: upper abs 5/5, lower abs 4/5, back ext 4/5  UE/Scapular LE   Shoulder Flex: R 4-5, L 4-5  Shoulder ABD (C5): R 4/5, L 4/5  Biceps (C6): R 4/5, L 4/5  Wrist ext (C6): R 4/5, L 4/5  Triceps (C7): R 4/5, L 4/5  Wrist Flex (C7): R 4/5, L 4/5  Digit Flex (C8): R 4/5, L 4/5  Thumb Ext (C8): R 4/5, L 4/5  Interossei (T1): R 4/5, L 4/5    Rhomboids: R 4-/5, L 4-/5  Mid trap: R 4-/5; L 4-/5  Lats: R 4/5, L 4/5  Low trap: R 3/5; L 3/5   Hip flexion (L2): R 4/5; L 4/5  Hip abduction: R 4/5; L 4/5  Hip Extension: R 4/5; L 4/5   Hip ER: R 4/5;  L 4/5  Hip IR: R 4/5; L 4/5  Knee Flexion: R 4/5; L 4/5   Knee extension (L3): R 4/5; L 4/5   DF (L4): R 4-/5; L 4/5  Great Toe Ext (L5): R 4/5, L 4/5  PF (S1): R 4-/5; L 4/5  EV R 4-/5, L 4/5  IV R 4-/5, L 4/5     Flexibility:   UE/Scapular LE   Upper Trap: R mod; L mod  Levator Scap: R mod; L mod  Pec Major: R mod; L mod  Scalenes: R mod, L mod Hip Flexor: RWNL, L WNL  Hamstrings: R mild; L mild  Piriformis: R mod; L mod  Quads: R WNL; L WNL  Gastroc-soleus: R mild; L mild     Special tests:   beighton score: 10/10 (+) hypermobility  Laxity R shoulder with pain, Laxity R ankle with pain    Gait: pt ambulates on level ground with mild antalgia, decreased step length L, and decreased stance phase R.  Balance: SLS R 5* new ankle sprain, L 30  TU seconds  5 times sit-stand: 10    LONG AND SHORT TERM GOALS    Goals: (to be met in 10 visits)     1. Improve core and scapular stability by 1/2 grade to promote improved posture and body mechanics with bending and lifting.Met    2. Improve 5 times sit-stand to 10 seconds or less to improve overall fall risk and safety. Met    3. Decrease symptoms by 50% to allow patient to sleep 6 hours without interruption from pain. Met    4. Improve B LE strength by 1/2 grade to allow patient to negotiate stairs with more ease. Met    5. Patient to demonstrate improved posture and body mechanics with bending/lifting up to 10 pounds.Met    6. Patient to be independent with HEP, joint protection principles, improved posture and body mechanics.Met      RECOMMENDATIONS AND PLAN OF TREATMENT  All goals met. Discharge patient. Patient to continue independently with current HEP.    REHAB POTENTIAL  Good - All functional goals met.    Patient/family/caregiver was advised of these findings, precautions, and treatment options and has agreed to actively participate in planning and for this course of care.    THANK YOU FOR THE REFERRAL OF Tiffany Maxwell.  IF YOU HAVE ANY QUESTIONS  PLEASE CONTACT ME AT: [facility] [phone]          SINCERELY,           EZIO HART, PT    PHYSICIAN'S CERTIFICATION REQUIRED: no  I certify the need for these services furnished under this plan of treatment and while under my care.    X_________________________________________________ Date: ____________________    CERTIFICATION FROM: 1/29/2025 to 4/29/2025    Thank you for the referral of Tiffany Maxwell. Please sign plan of care and return by fax.    Fax #: [facility] [phone]  [id number]

## 2025-01-30 NOTE — PROGRESS NOTES
Dear Dr. Mary Grace Mccormick MD,    This letter is to inform you of Tiffany Gamingureen KATERYNA Brodymaria doloreselvi's DISCHARGE SUMMARY in Physical Therapy at [facility].    DX: Hypermobility arthralgia; L patellofemoral pain, R ankle instability/pain             TREATMENT #   6    Of   6      DATE OF SERVICE: 1/29/2025    ASSESSMENT  Patient reports overall 50% improvement. States 5/10 pain. Patient reports she had held her last PT visit since she had sprained her R ankle while in [place] over christmas break. Pt. States she was on crutches and in a boot and now has returned to being able to walk in her gym shoes with minimal discomfort. Pt. States she would like to continue on her own at this time with her exercises. She is independent with current HEP and will continue on her own at this time.    OTHER  Pain: 5/10  Observation/Posture: double jointed, stands with knees hyperextended  Neuro Screen: reports \"sciatic pain\" down L LE to ankle; sensation intact to light touch    lumbar AROM: (* denotes performed with pain)  Flexion: palms to the floor  Extension: > 10 cm  Sidebending: R 10cm; L 10cm  Rotation: R 40cm; L 40cm    Accessory motion: hypermobility noted at all joints  Palpation: pt. Reports hypersensitivity to all palpation. Most along spine, R shoulder, L knee, and R ankle; laxity noted throughout    Strength: (* denotes performed with pain)  Core: upper abs 5/5, lower abs 4/5, back ext 4/5  UE/Scapular LE   Shoulder Flex: R 4-5, L 4-5  Shoulder ABD (C5): R 4/5, L 4/5  Biceps (C6): R 4/5, L 4/5  Wrist ext (C6): R 4/5, L 4/5  Triceps (C7): R 4/5, L 4/5  Wrist Flex (C7): R 4/5, L 4/5  Digit Flex (C8): R 4/5, L 4/5  Thumb Ext (C8): R 4/5, L 4/5  Interossei (T1): R 4/5, L 4/5    Rhomboids: R 4-/5, L 4-/5  Mid trap: R 4-/5; L 4-/5  Lats: R 4/5, L 4/5  Low trap: R 3/5; L 3/5   Hip flexion (L2): R 4/5; L 4/5  Hip abduction: R 4/5; L 4/5  Hip Extension: R 4/5; L 4/5   Hip ER: R 4/5;  L 4/5  Hip IR: R 4/5; L 4/5  Knee Flexion: R 4/5; L 4/5   Knee extension (L3): R 4/5; L 4/5   DF (L4): R 4-/5; L 4/5  Great Toe Ext (L5): R 4/5, L 4/5  PF (S1): R 4-/5; L 4/5  EV R 4-/5, L 4/5  IV R 4-/5, L 4/5     Flexibility:   UE/Scapular LE   Upper Trap: R mod; L mod  Levator Scap: R mod; L mod  Pec Major: R mod; L mod  Scalenes: R mod, L mod Hip Flexor: RWNL, L WNL  Hamstrings: R mild; L mild  Piriformis: R mod; L mod  Quads: R WNL; L WNL  Gastroc-soleus: R mild; L mild     Special tests:   beighton score: 10/10 (+) hypermobility  Laxity R shoulder with pain, Laxity R ankle with pain    Gait: pt ambulates on level ground with mild antalgia, decreased step length L, and decreased stance phase R.  Balance: SLS R 5* new ankle sprain, L 30  TU seconds  5 times sit-stand: 10    LONG AND SHORT TERM GOALS    Goals: (to be met in 10 visits)     1. Improve core and scapular stability by 1/2 grade to promote improved posture and body mechanics with bending and lifting.Met    2. Improve 5 times sit-stand to 10 seconds or less to improve overall fall risk and safety. Met    3. Decrease symptoms by 50% to allow patient to sleep 6 hours without interruption from pain. Met    4. Improve B LE strength by 1/2 grade to allow patient to negotiate stairs with more ease. Met    5. Patient to demonstrate improved posture and body mechanics with bending/lifting up to 10 pounds.Met    6. Patient to be independent with HEP, joint protection principles, improved posture and body mechanics.Met      RECOMMENDATIONS AND PLAN OF TREATMENT  All goals met. Discharge patient. Patient to continue independently with current HEP.    REHAB POTENTIAL  Good - All functional goals met.    Patient/family/caregiver was advised of these findings, precautions, and treatment options and has agreed to actively participate in planning and for this course of care.    THANK YOU FOR THE REFERRAL OF Tiffany Maxwell.  IF YOU HAVE ANY QUESTIONS  PLEASE CONTACT ME AT: [facility] [phone]          SINCERELY,           EZIO HART, PT    PHYSICIAN'S CERTIFICATION REQUIRED: no  I certify the need for these services furnished under this plan of treatment and while under my care.    X_________________________________________________ Date: ____________________    CERTIFICATION FROM: 1/29/2025 to 4/29/2025    Thank you for the referral of Tiffany Maxwell. Please sign plan of care and return by fax.    Fax #: [facility] [phone]  [id number]

## 2025-04-15 ENCOUNTER — OFFICE VISIT (OUTPATIENT)
Age: 30
End: 2025-04-15
Payer: COMMERCIAL

## 2025-04-15 VITALS
HEIGHT: 70 IN | HEART RATE: 88 BPM | DIASTOLIC BLOOD PRESSURE: 67 MMHG | BODY MASS INDEX: 23.19 KG/M2 | WEIGHT: 162 LBS | SYSTOLIC BLOOD PRESSURE: 102 MMHG

## 2025-04-15 DIAGNOSIS — M25.50 HYPERMOBILITY ARTHRALGIA: Primary | ICD-10-CM

## 2025-04-15 DIAGNOSIS — M25.50 POLYARTHRALGIA: ICD-10-CM

## 2025-04-15 DIAGNOSIS — G89.29 CHRONIC PAIN OF RIGHT ANKLE: ICD-10-CM

## 2025-04-15 DIAGNOSIS — M25.571 CHRONIC PAIN OF RIGHT ANKLE: ICD-10-CM

## 2025-04-15 PROCEDURE — 99214 OFFICE O/P EST MOD 30 MIN: CPT | Performed by: INTERNAL MEDICINE

## 2025-04-15 PROCEDURE — G2211 COMPLEX E/M VISIT ADD ON: HCPCS | Performed by: INTERNAL MEDICINE

## 2025-04-15 PROCEDURE — 3008F BODY MASS INDEX DOCD: CPT | Performed by: INTERNAL MEDICINE

## 2025-04-15 PROCEDURE — 3074F SYST BP LT 130 MM HG: CPT | Performed by: INTERNAL MEDICINE

## 2025-04-15 PROCEDURE — 3078F DIAST BP <80 MM HG: CPT | Performed by: INTERNAL MEDICINE

## 2025-04-15 RX ORDER — GABAPENTIN 100 MG/1
100 CAPSULE ORAL NIGHTLY
Qty: 30 CAPSULE | Refills: 1 | Status: SHIPPED | OUTPATIENT
Start: 2025-04-15

## 2025-04-15 NOTE — PATIENT INSTRUCTIONS
You were seen today for Hypermobility, chronic joint and muscle pain  Lets try low-dose gabapentin 100 mg at night  Watch out for any drowsiness or dizziness  Send a message in about 2 months, could always increase the dose  Follow-up in 4 months

## 2025-04-15 NOTE — PROGRESS NOTES
Tiffany Maxwell is a 29 year old female.    HPI:     Chief Complaint   Patient presents with    Follow - Up       I had the pleasure of seeing Tiffany Maxwell on 4/15/2025 for follow up Hypermobility arthralgia's    Current Medications:  Diclofenac as needed   Blood work:  Neg CTD, dsDNA  Neg ESR, CRP, RF, HLA-B27    Interval History:  This is a 30 yo F with no significant medical history referred for joint pain. She reports chronic joint pain since 2005 around age 8. She has had pain around the hips, knees and ankles. Around 2010 more joint pain in shoulders, wrists, hands. Right shoulder has dislocated 2 times around 2010. No swelling in the joints.   Now having more pain in right ankle.   She had to stop working out due to the pain in the shoulder. When reaching and grabbing will cause worsening right shoulder pain.  Also has lower back pain for a few years.   No hx of psoriasis  She was seen by physiatry Dr. Behar and xray right ankle and normal. Referred to PT and will be starting. Also started diclofenac as needed.   No family hx of autoimmune disease,  She reports being hypermobile.  Some mild dizziness   XR right shoulder was done in 2015 showing diastasis of AC joint, she reports having and MRI of the shoulder \"loose tendons.\"    4/15/2025:  Presents for f/u of diffuse joint pain, hypermobility arthralgia  She did PT and has helped   She sprained right ankle in Dec 2024 and still healing  She still has generalized joint pain throughout the day, pain 2/10  Some swelling in right ankle  No rashes  She takes diclofenac as needed, maybe once a week  You were seen today for          HISTORY:  Past Medical History[1]   Social Hx Reviewed   Family Hx Reviewed     Medications (Active prior to today's visit):  Current Medications[2]  .cmed  Allergies:  Allergies[3]      ROS:   All other ROS are negative.     PHYSICAL EXAM:   GEN: AAOx3, NAD  HEENT: EOMI, PERRLA, no injection or icterus, oral  mucosa moist, no oral lesions. No lymphadenopathy. No facial rash  CVS: RRR, no murmurs rubs or gallops. Equal 2+ distal pulses.   LUNGS: CTAB, no increased work of breathing  ABDOMEN:  soft NT/ND, +BS, no HSM  SKIN: No rashes or skin lesions. No nail findings  MSK:  Passively dorsiflex the fifth MC P joint past 90 degrees  Oppose the thumb to the volar aspect of the ipsilateral forearm  Hyperextend the elbows  Place the hands flat on the floor without bending her knees  NEURO: Cranial nerves II-XII intact grossly. 5/5 strength throughout in both upper and lower extremities, sensation intact.  PSYCH: normal mood       LABS:     Component      Latest Ref Rng 11/1/2024   Expanded ERIC Antibody Screen, IGG      <0.7 ug/l 0.10    Anti-dsDNA antibody      <10 IU/mL 2.0    Connective Tissue Disease Screen Interpretation      Negative  Negative    HLA-B27 Screening Negative    HLA-B27 Interpretation *    Reviewed By SEE COMMENT    SED RATE      0 - 20 mm/Hr 8    RHEUMATOID FACTOR      <14.0 IU/mL 8.2    C-REACTIVE PROTEIN      <=0.50 mg/dL 0.50          Imaging:     XR Right ankle 10/8/2024: normal     ASSESSMENT/PLAN:     Hypermobility arthralgias  - She has chronic joint and muscle pain.  Her Beighton score is 9/9.  She has had subluxation in her shoulders.  Has sprained her ankles multiple times.  - Currently has some pain in her right ankle from a sprain in December  - Discussed medication options.  Advised her that the medications may help dull some of her pain but unfortunately she may have chronic underlying pain  - She also did physical therapy which helped a lot of her pain  - She will trial low-dose gabapentin 100 mg at night.  Risk/benefits discussed with patient.  Discussed side effects which include drowsiness, sleepiness, blurry vision, fatigue    Pt will f/u in 4 mos    There is a longitudinal care relationship with me, the care plan reflects the ongoing nature of the continuous relationship of care, and the  medical record indicates that there is ongoing treatment of a serious/complex medical condition which I am currently managing.  is Applicable.     Leela Baez MD  4/15/2025   9:30 AM                 [1]   Past Medical History:   Back problem    lower back discomfort-sees chiropractor    Patient denies medical problems    UTI (urinary tract infection)    Visual impairment    glaases for driving   [2]   Current Outpatient Medications   Medication Sig Dispense Refill    Dexmethylphenidate HCl ER (FOCALIN XR) 20 MG Oral Capsule SR 24 Hr Take 1 capsule (20 mg total) by mouth every morning. 30 capsule 0    Dexmethylphenidate HCl ER 10 MG Oral Capsule SR 24 Hr Take 1 cap PO every afternoon at 1 PM 30 capsule 0    Dexmethylphenidate HCl ER 10 MG Oral Capsule SR 24 Hr Take 1 cap PO every afternoon at 1 PM (Patient not taking: Reported on 4/15/2025) 30 capsule 0    Dexmethylphenidate HCl ER 10 MG Oral Capsule SR 24 Hr Take 1 cap PO every afternoon at 1 PM (Patient not taking: Reported on 4/15/2025) 30 capsule 0    DICLOFENAC 75 MG Oral Tab EC TAKE 1 TABLET BY MOUTH 2 TIMES DAILY WITH FOOD FOR 2 WEEKS AS DIRECTED, THEN USE AS NEEDED. 60 tablet 0    NUVARING 0.12-0.015 MG/24HR Vaginal Ring Place 1 Ring vaginally every 30 (thirty) days. 3 Ring 3    cyclobenzaprine 5 MG Oral Tab Take 1 tablet (5 mg total) by mouth nightly. (Patient not taking: Reported on 4/15/2025) 30 tablet 0   [3] No Known Allergies

## 2025-06-17 NOTE — TELEPHONE ENCOUNTER
Requested Prescriptions     Pending Prescriptions Disp Refills    GABAPENTIN 100 MG Oral Cap [Pharmacy Med Name: GABAPENTIN 100 MG CAPSULE] 30 capsule 1     Sig: TAKE 1 CAPSULE BY MOUTH EVERY DAY NIGHTLY      Future Appointments   Date Time Provider Department Center   8/4/2025 11:30 AM Lul Davis PA-C [id number]   8/27/2025  9:20 AM Leela Baez MD [facility]       LOV: 4/15/2025  Last Refilled:4/15/25 #30 1RF       ASSESSMENT/PLAN:      Hypermobility arthralgias  - She has chronic joint and muscle pain.  Her Beighton score is 9/9.  She has had subluxation in her shoulders.  Has sprained her ankles multiple times.  - Currently has some pain in her right ankle from a sprain in December  - Discussed medication options.  Advised her that the medications may help dull some of her pain but unfortunately she may have chronic underlying pain  - She also did physical therapy which helped a lot of her pain  - She will trial low-dose gabapentin 100 mg at night.  Risk/benefits discussed with patient.  Discussed side effects which include drowsiness, sleepiness, blurry vision, fatigue     Pt will f/u in 4 mos     There is a longitudinal care relationship with me, the care plan reflects the ongoing nature of the continuous relationship of care, and the medical record indicates that there is ongoing treatment of a serious/complex medical condition which I am currently managing.  is Applicable.      Leela Baez MD  4/15/2025   9:30 AM

## 2025-06-18 RX ORDER — GABAPENTIN 100 MG/1
100 CAPSULE ORAL NIGHTLY
Qty: 30 CAPSULE | Refills: 1 | Status: SHIPPED | OUTPATIENT
Start: 2025-06-18

## 2025-08-02 ENCOUNTER — TELEPHONE (OUTPATIENT)
Age: 30
End: 2025-08-02

## 2025-08-02 DIAGNOSIS — M25.571 CHRONIC PAIN OF RIGHT ANKLE: ICD-10-CM

## 2025-08-02 DIAGNOSIS — M25.50 HYPERMOBILITY ARTHRALGIA: Primary | ICD-10-CM

## 2025-08-02 DIAGNOSIS — M25.50 POLYARTHRALGIA: ICD-10-CM

## 2025-08-02 DIAGNOSIS — G89.29 CHRONIC PAIN OF RIGHT ANKLE: ICD-10-CM

## 2025-08-25 RX ORDER — GABAPENTIN 100 MG/1
100 CAPSULE ORAL NIGHTLY
Qty: 30 CAPSULE | Refills: 1 | Status: SHIPPED | OUTPATIENT
Start: 2025-08-25

## 2025-08-27 ENCOUNTER — OFFICE VISIT (OUTPATIENT)
Age: 30
End: 2025-08-27

## 2025-08-27 VITALS
SYSTOLIC BLOOD PRESSURE: 82 MMHG | DIASTOLIC BLOOD PRESSURE: 55 MMHG | WEIGHT: 156 LBS | HEART RATE: 96 BPM | BODY MASS INDEX: 22.33 KG/M2 | HEIGHT: 70 IN

## 2025-08-27 DIAGNOSIS — M25.50 HYPERMOBILITY ARTHRALGIA: Primary | ICD-10-CM

## 2025-08-27 DIAGNOSIS — M25.551 RIGHT HIP PAIN: ICD-10-CM

## 2025-08-27 PROCEDURE — 3078F DIAST BP <80 MM HG: CPT | Performed by: INTERNAL MEDICINE

## 2025-08-27 PROCEDURE — 99214 OFFICE O/P EST MOD 30 MIN: CPT | Performed by: INTERNAL MEDICINE

## 2025-08-27 PROCEDURE — 3008F BODY MASS INDEX DOCD: CPT | Performed by: INTERNAL MEDICINE

## 2025-08-27 PROCEDURE — G2211 COMPLEX E/M VISIT ADD ON: HCPCS | Performed by: INTERNAL MEDICINE

## 2025-08-27 PROCEDURE — 3074F SYST BP LT 130 MM HG: CPT | Performed by: INTERNAL MEDICINE

## 2025-08-27 RX ORDER — DULOXETIN HYDROCHLORIDE 20 MG/1
20 CAPSULE, DELAYED RELEASE ORAL DAILY
Qty: 30 CAPSULE | Refills: 0 | Status: SHIPPED | OUTPATIENT
Start: 2025-08-27

## (undated) DEVICE — 3M™ TEGADERM™ TRANSPARENT FILM DRESSING, 1626W, 4 IN X 4-3/4 IN (10 CM X 12 CM), 50 EACH/CARTON, 4 CARTON/CASE: Brand: 3M™ TEGADERM™

## (undated) DEVICE — SUTURE VICRYL 0 J340H

## (undated) DEVICE — ENCORE® LATEX ACCLAIM SIZE 8, STERILE LATEX POWDER-FREE SURGICAL GLOVE: Brand: ENCORE

## (undated) DEVICE — SUTURE VICRYL 3-0 SH

## (undated) DEVICE — ENCORE® LATEX MICRO SIZE 8.5, STERILE LATEX POWDER-FREE SURGICAL GLOVE: Brand: ENCORE

## (undated) DEVICE — DERMABOND LIQUID ADHESIVE

## (undated) DEVICE — LAPAROTOMY: Brand: MEDLINE INDUSTRIES, INC.

## (undated) DEVICE — SUTURE VICRYL 0 CT-1

## (undated) DEVICE — DRAPE SHEET LAPAROTOMY

## (undated) DEVICE — SUTURE PLAIN GUT 2-0 CT

## (undated) DEVICE — SOL H2O 1000ML BTL

## (undated) DEVICE — SUTURE VCL 4-0 KS 27IN BRAID

## (undated) NOTE — LETTER
1/17/2020             RE: [id number]                 To Whom It May Concern,           [place] can work from home as needed until cleared to return to work without any restrictions at her

## (undated) NOTE — LETTER
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 Caresse Hodgkin is restricted at this time from any lifting or exercise.            Sincerely

## (undated) NOTE — LETTER
10/21/19        Marlene Reyez  [address] [place], [place] [id number]      Dear Salvador Sorensen,    Our records indicate that you have outstanding lab work and or testing that was ordered for you and has not yet been completed:  Orders Placed This Encounter